# Patient Record
Sex: FEMALE | Race: WHITE | Employment: UNEMPLOYED | URBAN - METROPOLITAN AREA
[De-identification: names, ages, dates, MRNs, and addresses within clinical notes are randomized per-mention and may not be internally consistent; named-entity substitution may affect disease eponyms.]

---

## 2018-03-20 ENCOUNTER — HOSPITAL ENCOUNTER (OUTPATIENT)
Dept: WOUND CARE | Age: 53
Discharge: HOME | End: 2018-03-20
Payer: COMMERCIAL

## 2018-03-20 VITALS
SYSTOLIC BLOOD PRESSURE: 108 MMHG | HEIGHT: 63 IN | DIASTOLIC BLOOD PRESSURE: 70 MMHG | BODY MASS INDEX: 35.44 KG/M2 | RESPIRATION RATE: 18 BRPM | HEART RATE: 100 BPM | WEIGHT: 200 LBS | TEMPERATURE: 97.9 F

## 2018-03-20 PROCEDURE — 99205 OFFICE O/P NEW HI 60 MIN: CPT

## 2018-03-20 PROCEDURE — 11042 DBRDMT SUBQ TIS 1ST 20SQCM/<: CPT

## 2018-03-20 PROCEDURE — 11045 DBRDMT SUBQ TISS EACH ADDL: CPT

## 2018-03-20 RX ORDER — FONDAPARINUX SODIUM 10 MG/.8ML
10 INJECTION SUBCUTANEOUS DAILY
COMMUNITY
End: 2018-04-06 | Stop reason: ALTCHOICE

## 2018-03-20 RX ORDER — OXYCODONE HYDROCHLORIDE 5 MG/1
5 TABLET ORAL EVERY 6 HOURS PRN
COMMUNITY

## 2018-03-20 NOTE — PROGRESS NOTES
needed, Disp: , Rfl:     oxyCODONE (ROXICODONE) 5 MG immediate release tablet, Take 5 mg by mouth every 6 hours as needed for Pain., Disp: , Rfl:     HYDROXYZINE HCL PO, Take  by mouth., Disp: , Rfl:     escitalopram (LEXAPRO) 20 MG tablet, Take 20 mg by mouth daily. , Disp: , Rfl:     ondansetron (ZOFRAN ODT) 4 MG disintegrating tablet, Take 1 tablet by mouth every 8 hours as needed for Nausea or Vomiting for 10 doses. , Disp: 10 tablet, Rfl: 0    Current Facility-Administered Medications:     lidocaine (XYLOCAINE) 2 % jelly, , Topical, Once, Valente Perez MD  Allergies:  Patient has no known allergies. Social History     Social History    Marital status:      Spouse name: N/A    Number of children: N/A    Years of education: N/A     Occupational History    Not on file. Social History Main Topics    Smoking status: Former Smoker     Quit date: 2012    Smokeless tobacco: Never Used    Alcohol use Yes      Comment: occ    Drug use: No    Sexual activity: Not on file     Other Topics Concern    Not on file     Social History Narrative    No narrative on file     Family History   Problem Relation Age of Onset    Cancer Mother     Diabetes Mother     Cancer Father        Objective:    /70   Pulse 100   Temp 97.9 °F (36.6 °C) (Oral)   Resp 18   Ht 5' 3\" (1.6 m)   Wt 200 lb (90.7 kg)   BMI 35.43 kg/m²   Wound Evaluation  - Undermining is  present  - Fibrinous exudate - Minimal amt  - Hyperkeratotic tissue - Minimal amt  - Granulation tissue - Large amt  - Errythema - Minimal amt    Wound 03/20/18 Surgical dehiscence Other (Comment) #1 aquired [id number] suprapubic-Wound Length (cm): 17 cm  Wound 03/20/18 Surgical dehiscence Other (Comment) #1 aquired [id number] suprapubic-Wound Width (cm): 19.3 cm  Wound 03/20/18 Surgical dehiscence Other (Comment) #1 aquired [id number] suprapubic-Wound Depth (cm) : 4.4 Measurements shown are from today's visit.     /70   Pulse 100   Temp 97.9

## 2018-03-27 ENCOUNTER — HOSPITAL ENCOUNTER (OUTPATIENT)
Dept: WOUND CARE | Age: 53
Discharge: HOME | End: 2018-03-27
Payer: COMMERCIAL

## 2018-03-27 VITALS
BODY MASS INDEX: 35.44 KG/M2 | TEMPERATURE: 98.1 F | RESPIRATION RATE: 18 BRPM | SYSTOLIC BLOOD PRESSURE: 138 MMHG | HEIGHT: 63 IN | WEIGHT: 200 LBS | DIASTOLIC BLOOD PRESSURE: 70 MMHG | HEART RATE: 92 BPM

## 2018-03-27 PROCEDURE — 11045 DBRDMT SUBQ TISS EACH ADDL: CPT

## 2018-03-27 PROCEDURE — 11042 DBRDMT SUBQ TIS 1ST 20SQCM/<: CPT

## 2018-03-27 NOTE — PROGRESS NOTES
Wound Care Center Follow-Up Progress Note    Nidia Romero  AGE: 48 y.o. GENDER: female  : 1965    TODAY'S DATE:  3/27/2018    Subjective:    Nidia Romero is a 48 y.o. female who presents today for follow-up examination and treatment of a delayed-healing wound(s). The patient denies any problems since last visit. Objective:    /70   Pulse 92   Temp 98.1 °F (36.7 °C) (Oral)   Resp 18   Ht 5' 3\" (1.6 m)   Wt 200 lb (90.7 kg)   BMI 35.43 kg/m²     (Wound Reference Date is when first assessed. Measurements shown are from today's visit.)    Wound 18 Surgical dehiscence Other (Comment) #1 aquired [id number] suprapubic (Active)   Wound Image   3/20/2018  2:20 PM   Wound Type Wound 3/20/2018  2:20 PM   Wound Other 3/20/2018  2:20 PM   Dressing Status Clean;Dry; Intact 3/20/2018  3:02 PM   Dressing Changed Changed/New 3/20/2018  3:02 PM   Wound Cleansed Rinsed/Irrigated with saline 3/20/2018  3:02 PM   Wound Length (cm) 17 cm 3/27/2018  3:31 PM   Wound Width (cm) 19.2 cm 3/27/2018  3:31 PM   Wound Depth (cm)  2.5 3/27/2018  3:31 PM   Calculated Wound Size (cm^2) (l*w) 326.4 cm^2 3/27/2018  3:31 PM   Change in Wound Size % (l*w) 0.52 3/27/2018  3:31 PM   Wound Assessment Red;Yellow;Pink 3/27/2018  2:55 PM   Drainage Amount Copious 3/27/2018  2:55 PM   Drainage Description Serosanguinous 3/27/2018  2:55 PM   Odor None 3/27/2018  2:55 PM   Charlotte-wound Assessment Intact 3/27/2018  2:55 PM   Time out Yes 3/20/2018  2:56 PM   Procedural Pain 3 3/20/2018  2:56 PM   Post procedural Pain 0 3/20/2018  2:56 PM   Number of days: 7        Other physical exam findings:        Assessment:     There is no problem list on file for this patient. Overall Wound Assessment  Wound has significantly improved. Size has decreased. Appearance has significantly improved.     (Please refer to nursing measurements and assessment regarding wound measurements.) Wound check - Care provided includes removal of

## 2018-04-06 ENCOUNTER — HOSPITAL ENCOUNTER (OUTPATIENT)
Dept: WOUND CARE | Age: 53
Discharge: HOME | End: 2018-04-06
Payer: COMMERCIAL

## 2018-04-06 VITALS
WEIGHT: 200 LBS | HEART RATE: 72 BPM | BODY MASS INDEX: 35.44 KG/M2 | DIASTOLIC BLOOD PRESSURE: 68 MMHG | RESPIRATION RATE: 16 BRPM | SYSTOLIC BLOOD PRESSURE: 120 MMHG | HEIGHT: 63 IN | TEMPERATURE: 97.8 F

## 2018-04-06 PROCEDURE — 97607 NEG PRS WND THR NDME<=50SQCM: CPT

## 2018-04-06 PROCEDURE — 11042 DBRDMT SUBQ TIS 1ST 20SQCM/<: CPT | Performed by: FAMILY MEDICINE

## 2018-04-06 PROCEDURE — 11042 DBRDMT SUBQ TIS 1ST 20SQCM/<: CPT

## 2018-04-06 PROCEDURE — 99213 OFFICE O/P EST LOW 20 MIN: CPT

## 2018-04-13 ENCOUNTER — HOSPITAL ENCOUNTER (OUTPATIENT)
Dept: WOUND CARE | Age: 53
Discharge: HOME | End: 2018-04-13
Payer: COMMERCIAL

## 2018-04-13 VITALS
SYSTOLIC BLOOD PRESSURE: 110 MMHG | TEMPERATURE: 98.3 F | RESPIRATION RATE: 14 BRPM | DIASTOLIC BLOOD PRESSURE: 60 MMHG | HEART RATE: 76 BPM

## 2018-04-13 PROCEDURE — 11042 DBRDMT SUBQ TIS 1ST 20SQCM/<: CPT

## 2018-04-13 PROCEDURE — 11045 DBRDMT SUBQ TISS EACH ADDL: CPT

## 2018-04-20 ENCOUNTER — HOSPITAL ENCOUNTER (OUTPATIENT)
Dept: WOUND CARE | Age: 53
Discharge: HOME | End: 2018-04-20
Payer: COMMERCIAL

## 2018-04-20 VITALS
HEART RATE: 76 BPM | HEIGHT: 63 IN | SYSTOLIC BLOOD PRESSURE: 108 MMHG | DIASTOLIC BLOOD PRESSURE: 54 MMHG | BODY MASS INDEX: 35.44 KG/M2 | RESPIRATION RATE: 18 BRPM | TEMPERATURE: 98 F | WEIGHT: 200 LBS

## 2018-04-20 PROCEDURE — 11042 DBRDMT SUBQ TIS 1ST 20SQCM/<: CPT

## 2018-04-20 PROCEDURE — 97598 DBRDMT OPN WND ADDL 20CM/<: CPT | Performed by: FAMILY MEDICINE

## 2018-04-20 PROCEDURE — 97597 DBRDMT OPN WND 1ST 20 CM/<: CPT | Performed by: FAMILY MEDICINE

## 2018-05-04 ENCOUNTER — HOSPITAL ENCOUNTER (OUTPATIENT)
Dept: WOUND CARE | Age: 53
Discharge: HOME | End: 2018-05-04

## 2018-05-18 ENCOUNTER — HOSPITAL ENCOUNTER (OUTPATIENT)
Dept: WOUND CARE | Age: 53
Discharge: HOME | End: 2018-05-18
Payer: MEDICAID

## 2018-05-18 VITALS
DIASTOLIC BLOOD PRESSURE: 72 MMHG | HEART RATE: 80 BPM | TEMPERATURE: 97.9 F | RESPIRATION RATE: 18 BRPM | SYSTOLIC BLOOD PRESSURE: 110 MMHG | HEIGHT: 63 IN | BODY MASS INDEX: 35.44 KG/M2 | WEIGHT: 200 LBS

## 2018-05-18 DIAGNOSIS — B36.9 FUNGAL DERMATITIS: Primary | ICD-10-CM

## 2018-05-18 PROCEDURE — 11042 DBRDMT SUBQ TIS 1ST 20SQCM/<: CPT

## 2018-05-18 PROCEDURE — 11045 DBRDMT SUBQ TISS EACH ADDL: CPT | Performed by: FAMILY MEDICINE

## 2018-05-18 PROCEDURE — 11042 DBRDMT SUBQ TIS 1ST 20SQCM/<: CPT | Performed by: FAMILY MEDICINE

## 2018-05-18 PROCEDURE — 11045 DBRDMT SUBQ TISS EACH ADDL: CPT

## 2018-05-18 RX ORDER — KETOCONAZOLE 20 MG/G
CREAM TOPICAL
Qty: 60 G | Refills: 1 | Status: SHIPPED | OUTPATIENT
Start: 2018-05-18

## 2018-05-25 ENCOUNTER — HOSPITAL ENCOUNTER (OUTPATIENT)
Dept: WOUND CARE | Age: 53
Discharge: HOME | End: 2018-05-25
Payer: MEDICAID

## 2018-05-25 VITALS
DIASTOLIC BLOOD PRESSURE: 56 MMHG | BODY MASS INDEX: 35.44 KG/M2 | TEMPERATURE: 97.6 F | HEART RATE: 76 BPM | RESPIRATION RATE: 20 BRPM | SYSTOLIC BLOOD PRESSURE: 100 MMHG | WEIGHT: 200 LBS | HEIGHT: 63 IN

## 2018-05-25 PROCEDURE — 11042 DBRDMT SUBQ TIS 1ST 20SQCM/<: CPT | Performed by: FAMILY MEDICINE

## 2018-05-25 PROCEDURE — 11042 DBRDMT SUBQ TIS 1ST 20SQCM/<: CPT

## 2018-05-25 PROCEDURE — 11045 DBRDMT SUBQ TISS EACH ADDL: CPT

## 2018-05-25 PROCEDURE — 11045 DBRDMT SUBQ TISS EACH ADDL: CPT | Performed by: FAMILY MEDICINE

## 2018-06-01 ENCOUNTER — HOSPITAL ENCOUNTER (OUTPATIENT)
Dept: WOUND CARE | Age: 53
Discharge: HOME | End: 2018-06-01
Payer: COMMERCIAL

## 2018-06-01 VITALS
HEIGHT: 63 IN | SYSTOLIC BLOOD PRESSURE: 108 MMHG | DIASTOLIC BLOOD PRESSURE: 60 MMHG | WEIGHT: 200 LBS | RESPIRATION RATE: 20 BRPM | TEMPERATURE: 97.7 F | BODY MASS INDEX: 35.44 KG/M2 | HEART RATE: 82 BPM

## 2018-06-01 PROCEDURE — 11045 DBRDMT SUBQ TISS EACH ADDL: CPT

## 2018-06-01 PROCEDURE — 11045 DBRDMT SUBQ TISS EACH ADDL: CPT | Performed by: FAMILY MEDICINE

## 2018-06-01 PROCEDURE — 11042 DBRDMT SUBQ TIS 1ST 20SQCM/<: CPT | Performed by: FAMILY MEDICINE

## 2018-06-01 PROCEDURE — 11042 DBRDMT SUBQ TIS 1ST 20SQCM/<: CPT

## 2018-06-08 ENCOUNTER — HOSPITAL ENCOUNTER (OUTPATIENT)
Dept: WOUND CARE | Age: 53
Discharge: HOME | End: 2018-06-08
Payer: MEDICAID

## 2018-06-08 VITALS
HEART RATE: 64 BPM | TEMPERATURE: 98 F | DIASTOLIC BLOOD PRESSURE: 62 MMHG | SYSTOLIC BLOOD PRESSURE: 100 MMHG | RESPIRATION RATE: 18 BRPM

## 2018-06-08 PROCEDURE — 97597 DBRDMT OPN WND 1ST 20 CM/<: CPT | Performed by: FAMILY MEDICINE

## 2018-06-08 PROCEDURE — 97597 DBRDMT OPN WND 1ST 20 CM/<: CPT

## 2018-06-15 ENCOUNTER — HOSPITAL ENCOUNTER (OUTPATIENT)
Dept: WOUND CARE | Age: 53
Discharge: HOME | End: 2018-06-15
Payer: MEDICAID

## 2018-06-15 VITALS
HEART RATE: 88 BPM | HEIGHT: 63 IN | DIASTOLIC BLOOD PRESSURE: 64 MMHG | TEMPERATURE: 98 F | BODY MASS INDEX: 35.44 KG/M2 | SYSTOLIC BLOOD PRESSURE: 112 MMHG | RESPIRATION RATE: 18 BRPM | WEIGHT: 200 LBS

## 2018-06-15 PROCEDURE — 11042 DBRDMT SUBQ TIS 1ST 20SQCM/<: CPT | Performed by: FAMILY MEDICINE

## 2018-06-15 PROCEDURE — 11042 DBRDMT SUBQ TIS 1ST 20SQCM/<: CPT

## 2018-06-22 ENCOUNTER — HOSPITAL ENCOUNTER (OUTPATIENT)
Dept: WOUND CARE | Age: 53
Discharge: HOME | End: 2018-06-22
Payer: MEDICAID

## 2018-06-22 VITALS
RESPIRATION RATE: 18 BRPM | SYSTOLIC BLOOD PRESSURE: 110 MMHG | DIASTOLIC BLOOD PRESSURE: 60 MMHG | TEMPERATURE: 98 F | HEART RATE: 76 BPM

## 2018-06-22 PROCEDURE — 99212 OFFICE O/P EST SF 10 MIN: CPT

## 2018-06-22 PROCEDURE — 99213 OFFICE O/P EST LOW 20 MIN: CPT | Performed by: FAMILY MEDICINE

## 2018-07-06 ENCOUNTER — HOSPITAL ENCOUNTER (OUTPATIENT)
Dept: WOUND CARE | Age: 53
Discharge: HOME | End: 2018-07-06
Payer: MEDICAID

## 2018-07-06 VITALS
SYSTOLIC BLOOD PRESSURE: 110 MMHG | DIASTOLIC BLOOD PRESSURE: 62 MMHG | RESPIRATION RATE: 16 BRPM | HEART RATE: 72 BPM | TEMPERATURE: 98 F

## 2018-07-06 PROCEDURE — 99212 OFFICE O/P EST SF 10 MIN: CPT | Performed by: FAMILY MEDICINE

## 2018-07-06 PROCEDURE — 99212 OFFICE O/P EST SF 10 MIN: CPT

## 2018-07-06 NOTE — PROGRESS NOTES
Follow-Up Wound Progress Note  Alana Armendariz  AGE: 48 y.o. GENDER: female  DOD: 1965  TODAY'S DATE:  7/6/18  Subjective:    Lianna Schmitt is a 48 y.o. female who presents today for wound check. Wound Etiology :  dehisced surgical wound  Wound Location :   suprapubic and right groin now is no open wound there is only some excoriation Pain : {1/10     Abx : Absent       Overall Wound Assessment  Wound is healed. Size has decreased    Objective:    /62   Pulse 72   Temp 98 °F (36.7 °C) (Oral)   Resp 16   Wound 03/20/18 Surgical dehiscence Other (Comment) #1 At [facility] [id number] suprapubic (Active)   Wound Image   3/20/2018  2:20 PM   Wound Type Wound 3/20/2018  2:20 PM   Wound Other 3/20/2018  2:20 PM   Dressing Status Clean;Dry; Intact 7/6/2018 11:56 AM   Dressing Changed Changed/New 7/6/2018 11:56 AM   Dressing/Treatment Dry dressing 7/6/2018 11:56 AM   Wound Cleansed Rinsed/Irrigated with saline 7/6/2018 11:56 AM   Wound Length (cm) 0 cm 6/22/2018  9:52 AM   Wound Width (cm) 0 cm 6/22/2018  9:52 AM   Wound Depth (cm)  0 6/22/2018  9:52 AM   Calculated Wound Size (cm^2) (l*w) 0 cm^2 6/22/2018  9:52 AM   Change in Wound Size % (l*w) 100 6/22/2018  9:52 AM   Wound Assessment Pink;Red 6/15/2018  9:34 AM   Drainage Amount Moderate 6/15/2018  9:34 AM   Drainage Description Goff;Brown 6/15/2018  9:34 AM   Odor None 6/15/2018  9:34 AM   Charlotte-wound Assessment Pink; Maceration 6/15/2018  9:34 AM   Time out Yes 6/15/2018 10:06 AM   Procedural Pain 0 6/15/2018 10:06 AM   Post procedural Pain 0 6/15/2018 10:06 AM   Number of days: 108     Wound   Dry with some excoriation  Errythema - Present    (this wound was originally measured on:)            Measurements shown are from today's visit. Assessment:     Please refer to nursing measurements and assessment regarding wound size pre and post debridement.   Wound check - Care provided includes removal of existing dressing and visual

## 2023-08-08 LAB
ANION GAP IN SER/PLAS: 14 MMOL/L (ref 10–20)
CALCIUM (MG/DL) IN SER/PLAS: 9.9 MG/DL (ref 8.6–10.3)
CARBON DIOXIDE, TOTAL (MMOL/L) IN SER/PLAS: 24 MMOL/L (ref 21–32)
CHLORIDE (MMOL/L) IN SER/PLAS: 105 MMOL/L (ref 98–107)
CREATININE (MG/DL) IN SER/PLAS: 1 MG/DL (ref 0.5–1.05)
GFR FEMALE: 66 ML/MIN/1.73M2
GLUCOSE (MG/DL) IN SER/PLAS: 140 MG/DL (ref 74–99)
POTASSIUM (MMOL/L) IN SER/PLAS: 4.4 MMOL/L (ref 3.5–5.3)
SODIUM (MMOL/L) IN SER/PLAS: 139 MMOL/L (ref 136–145)
UREA NITROGEN (MG/DL) IN SER/PLAS: 15 MG/DL (ref 6–23)

## 2023-08-09 LAB — CANCER AG 125 (U/ML) IN SERUM: 12.8 U/ML (ref 0–30.2)

## 2023-08-30 PROBLEM — F43.10 PTSD (POST-TRAUMATIC STRESS DISORDER): Status: ACTIVE | Noted: 2023-08-30

## 2023-08-30 PROBLEM — K43.0: Status: ACTIVE | Noted: 2023-08-30

## 2023-08-30 PROBLEM — I82.409 DEEP VENOUS THROMBOSIS (MULTI): Status: ACTIVE | Noted: 2023-08-30

## 2023-08-30 PROBLEM — E03.9 HYPOTHYROIDISM: Status: ACTIVE | Noted: 2023-08-30

## 2023-08-30 PROBLEM — S33.5XXA LUMBAR SPRAIN: Status: ACTIVE | Noted: 2023-08-30

## 2023-08-30 PROBLEM — E11.9 TYPE 2 DIABETES MELLITUS (MULTI): Status: ACTIVE | Noted: 2023-08-30

## 2023-08-30 PROBLEM — N20.0 KIDNEY STONES: Status: ACTIVE | Noted: 2023-08-30

## 2023-08-30 PROBLEM — C56.9 MALIGNANT NEOPLASM OF OVARY (MULTI): Status: ACTIVE | Noted: 2023-08-30

## 2023-08-30 PROBLEM — E78.2 MIXED HYPERLIPIDEMIA: Status: ACTIVE | Noted: 2023-08-30

## 2023-08-30 PROBLEM — H35.30 MACULAR DEGENERATION: Status: ACTIVE | Noted: 2023-08-30

## 2023-08-30 PROBLEM — Z96.1 BILATERAL PSEUDOPHAKIA: Status: ACTIVE | Noted: 2023-08-30

## 2023-08-30 PROBLEM — R53.81 MALAISE: Status: ACTIVE | Noted: 2023-08-30

## 2023-08-30 PROBLEM — Z86.718 HISTORY OF DEEP VEIN THROMBOSIS: Status: ACTIVE | Noted: 2023-08-30

## 2023-08-30 PROBLEM — G62.9 NEUROPATHY: Status: ACTIVE | Noted: 2023-08-30

## 2023-08-30 PROBLEM — N23 RENAL COLIC: Status: ACTIVE | Noted: 2023-08-30

## 2023-08-30 PROBLEM — R30.0 DYSURIA: Status: ACTIVE | Noted: 2023-08-30

## 2023-08-30 PROBLEM — B02.23 POST-HERPETIC POLYNEUROPATHY: Status: ACTIVE | Noted: 2023-08-30

## 2023-08-30 RX ORDER — BUPROPION HYDROCHLORIDE 150 MG/1
1 TABLET ORAL DAILY
COMMUNITY
Start: 2021-10-22 | End: 2023-10-09 | Stop reason: ALTCHOICE

## 2023-08-30 RX ORDER — LEVOTHYROXINE SODIUM 100 UG/1
1 TABLET ORAL
COMMUNITY
Start: 2019-12-18 | End: 2024-03-04

## 2023-08-30 RX ORDER — ALBUTEROL SULFATE 90 UG/1
2 AEROSOL, METERED RESPIRATORY (INHALATION) EVERY 6 HOURS PRN
COMMUNITY
Start: 2018-02-23 | End: 2023-10-17 | Stop reason: ALTCHOICE

## 2023-08-30 RX ORDER — LISINOPRIL 2.5 MG/1
1 TABLET ORAL DAILY
COMMUNITY
Start: 2018-10-04 | End: 2024-03-26

## 2023-08-30 RX ORDER — LAMOTRIGINE 25 MG/1
1 TABLET ORAL DAILY
COMMUNITY
End: 2023-10-09 | Stop reason: ALTCHOICE

## 2023-08-30 RX ORDER — LEVOTHYROXINE SODIUM 75 UG/1
1 TABLET ORAL DAILY
COMMUNITY
End: 2023-10-09 | Stop reason: ALTCHOICE

## 2023-08-30 RX ORDER — SEMAGLUTIDE 0.68 MG/ML
INJECTION, SOLUTION SUBCUTANEOUS
COMMUNITY
Start: 2023-04-07 | End: 2023-10-09 | Stop reason: ALTCHOICE

## 2023-08-30 RX ORDER — SITAGLIPTIN AND METFORMIN HYDROCHLORIDE 1000; 50 MG/1; MG/1
2 TABLET, FILM COATED, EXTENDED RELEASE ORAL DAILY
COMMUNITY
End: 2023-10-09 | Stop reason: ALTCHOICE

## 2023-08-30 RX ORDER — KETOCONAZOLE 20 MG/G
1 CREAM TOPICAL 2 TIMES DAILY
COMMUNITY
Start: 2022-06-08 | End: 2023-10-17 | Stop reason: ALTCHOICE

## 2023-08-30 RX ORDER — PAROXETINE 30 MG/1
2 TABLET, FILM COATED ORAL EVERY MORNING
COMMUNITY
Start: 2020-03-09 | End: 2023-11-16 | Stop reason: ALTCHOICE

## 2023-08-30 RX ORDER — TRAZODONE HYDROCHLORIDE 50 MG/1
1 TABLET ORAL NIGHTLY PRN
COMMUNITY
Start: 2022-09-21 | End: 2023-10-09 | Stop reason: ALTCHOICE

## 2023-08-30 RX ORDER — CETIRIZINE HYDROCHLORIDE 10 MG/1
1 TABLET ORAL DAILY PRN
COMMUNITY
Start: 2018-08-27

## 2023-08-30 RX ORDER — BUPROPION HYDROCHLORIDE 300 MG/1
1 TABLET ORAL EVERY MORNING
COMMUNITY
End: 2023-10-17 | Stop reason: ALTCHOICE

## 2023-08-30 RX ORDER — IBUPROFEN 200 MG
2 TABLET ORAL DAILY PRN
COMMUNITY
End: 2023-10-09 | Stop reason: ALTCHOICE

## 2023-08-30 RX ORDER — LORAZEPAM 0.5 MG/1
1 TABLET ORAL DAILY PRN
COMMUNITY
Start: 2018-04-02 | End: 2024-05-16 | Stop reason: ALTCHOICE

## 2023-08-30 RX ORDER — TRAMADOL HYDROCHLORIDE 50 MG/1
1 TABLET ORAL EVERY 6 HOURS PRN
COMMUNITY
Start: 2023-02-12 | End: 2023-10-09 | Stop reason: ALTCHOICE

## 2023-10-09 ENCOUNTER — ANESTHESIA EVENT (OUTPATIENT)
Dept: OPERATING ROOM | Facility: HOSPITAL | Age: 58
End: 2023-10-09
Payer: COMMERCIAL

## 2023-10-09 ENCOUNTER — ANESTHESIA (OUTPATIENT)
Dept: OPERATING ROOM | Facility: HOSPITAL | Age: 58
End: 2023-10-09
Payer: COMMERCIAL

## 2023-10-09 ENCOUNTER — HOSPITAL ENCOUNTER (OUTPATIENT)
Dept: OPERATING ROOM | Facility: HOSPITAL | Age: 58
Setting detail: OUTPATIENT SURGERY
Discharge: HOME | End: 2023-10-09
Payer: COMMERCIAL

## 2023-10-09 VITALS
HEART RATE: 65 BPM | WEIGHT: 189.26 LBS | RESPIRATION RATE: 16 BRPM | HEIGHT: 63 IN | TEMPERATURE: 97.5 F | OXYGEN SATURATION: 99 % | BODY MASS INDEX: 33.54 KG/M2 | DIASTOLIC BLOOD PRESSURE: 72 MMHG | SYSTOLIC BLOOD PRESSURE: 93 MMHG

## 2023-10-09 DIAGNOSIS — Z12.11 ENCOUNTER FOR SCREENING FOR MALIGNANT NEOPLASM OF COLON: ICD-10-CM

## 2023-10-09 LAB — GLUCOSE BLD MANUAL STRIP-MCNC: 151 MG/DL (ref 74–99)

## 2023-10-09 PROCEDURE — 3600000002 HC OR TIME - INITIAL BASE CHARGE - PROCEDURE LEVEL TWO: Performed by: STUDENT IN AN ORGANIZED HEALTH CARE EDUCATION/TRAINING PROGRAM

## 2023-10-09 PROCEDURE — 82947 ASSAY GLUCOSE BLOOD QUANT: CPT

## 2023-10-09 PROCEDURE — A45378 PR COLONOSCOPY,DIAGNOSTIC: Performed by: NURSE ANESTHETIST, CERTIFIED REGISTERED

## 2023-10-09 PROCEDURE — 2580000001 HC RX 258 IV SOLUTIONS: Performed by: ANESTHESIOLOGY

## 2023-10-09 PROCEDURE — 7100000009 HC PHASE TWO TIME - INITIAL BASE CHARGE: Performed by: STUDENT IN AN ORGANIZED HEALTH CARE EDUCATION/TRAINING PROGRAM

## 2023-10-09 PROCEDURE — 3700000002 HC GENERAL ANESTHESIA TIME - EACH INCREMENTAL 1 MINUTE: Performed by: STUDENT IN AN ORGANIZED HEALTH CARE EDUCATION/TRAINING PROGRAM

## 2023-10-09 PROCEDURE — 3700000001 HC GENERAL ANESTHESIA TIME - INITIAL BASE CHARGE: Performed by: STUDENT IN AN ORGANIZED HEALTH CARE EDUCATION/TRAINING PROGRAM

## 2023-10-09 PROCEDURE — 2500000004 HC RX 250 GENERAL PHARMACY W/ HCPCS (ALT 636 FOR OP/ED): Performed by: NURSE ANESTHETIST, CERTIFIED REGISTERED

## 2023-10-09 PROCEDURE — 45378 DIAGNOSTIC COLONOSCOPY: CPT | Performed by: INTERNAL MEDICINE

## 2023-10-09 PROCEDURE — 7100000010 HC PHASE TWO TIME - EACH INCREMENTAL 1 MINUTE: Performed by: STUDENT IN AN ORGANIZED HEALTH CARE EDUCATION/TRAINING PROGRAM

## 2023-10-09 PROCEDURE — A45378 PR COLONOSCOPY,DIAGNOSTIC: Performed by: STUDENT IN AN ORGANIZED HEALTH CARE EDUCATION/TRAINING PROGRAM

## 2023-10-09 PROCEDURE — 3600000007 HC OR TIME - EACH INCREMENTAL 1 MINUTE - PROCEDURE LEVEL TWO: Performed by: STUDENT IN AN ORGANIZED HEALTH CARE EDUCATION/TRAINING PROGRAM

## 2023-10-09 RX ORDER — PROPOFOL 10 MG/ML
INJECTION, EMULSION INTRAVENOUS CONTINUOUS PRN
Status: DISCONTINUED | OUTPATIENT
Start: 2023-10-09 | End: 2023-10-09

## 2023-10-09 RX ORDER — MIDAZOLAM HYDROCHLORIDE 1 MG/ML
INJECTION, SOLUTION INTRAMUSCULAR; INTRAVENOUS AS NEEDED
Status: DISCONTINUED | OUTPATIENT
Start: 2023-10-09 | End: 2023-10-09

## 2023-10-09 RX ORDER — TRAZODONE HYDROCHLORIDE 100 MG/1
100 TABLET ORAL NIGHTLY
COMMUNITY
End: 2023-11-16 | Stop reason: SDUPTHER

## 2023-10-09 RX ORDER — DULAGLUTIDE 1.5 MG/.5ML
1.5 INJECTION, SOLUTION SUBCUTANEOUS
COMMUNITY
Start: 2023-09-18 | End: 2024-02-23 | Stop reason: DRUGHIGH

## 2023-10-09 RX ORDER — SODIUM CHLORIDE, SODIUM LACTATE, POTASSIUM CHLORIDE, CALCIUM CHLORIDE 600; 310; 30; 20 MG/100ML; MG/100ML; MG/100ML; MG/100ML
100 INJECTION, SOLUTION INTRAVENOUS CONTINUOUS
Status: DISCONTINUED | OUTPATIENT
Start: 2023-10-09 | End: 2023-10-20 | Stop reason: HOSPADM

## 2023-10-09 RX ORDER — PROPOFOL 10 MG/ML
INJECTION, EMULSION INTRAVENOUS AS NEEDED
Status: DISCONTINUED | OUTPATIENT
Start: 2023-10-09 | End: 2023-10-09

## 2023-10-09 RX ADMIN — PROPOFOL 40 MG: 10 INJECTION, EMULSION INTRAVENOUS at 10:37

## 2023-10-09 RX ADMIN — PROPOFOL 100 MCG/KG/MIN: 10 INJECTION, EMULSION INTRAVENOUS at 10:37

## 2023-10-09 RX ADMIN — SODIUM CHLORIDE, POTASSIUM CHLORIDE, SODIUM LACTATE AND CALCIUM CHLORIDE 100 ML/HR: 600; 310; 30; 20 INJECTION, SOLUTION INTRAVENOUS at 09:54

## 2023-10-09 RX ADMIN — MIDAZOLAM 2 MG: 1 INJECTION INTRAMUSCULAR; INTRAVENOUS at 10:35

## 2023-10-09 SDOH — HEALTH STABILITY: MENTAL HEALTH: CURRENT SMOKER: 0

## 2023-10-09 ASSESSMENT — PAIN SCALES - GENERAL
PAINLEVEL_OUTOF10: 0 - NO PAIN
PAIN_LEVEL: 0
PAINLEVEL_OUTOF10: 0 - NO PAIN

## 2023-10-09 ASSESSMENT — ENCOUNTER SYMPTOMS: CONSTIPATION: 1

## 2023-10-09 ASSESSMENT — PAIN - FUNCTIONAL ASSESSMENT: PAIN_FUNCTIONAL_ASSESSMENT: 0-10

## 2023-10-09 NOTE — ANESTHESIA POSTPROCEDURE EVALUATION
Patient: Serenity Russell    Procedure Summary       Date: 10/09/23 Room / Location: [facility] OR    Anesthesia Start: 1033 Anesthesia Stop: 1102    Procedure: COLONOSCOPY Diagnosis: Encounter for screening for malignant neoplasm of colon    Scheduled Providers: Bony Turner MD; LYNDSEY Hess-CHARLEE; Bryan Donnelly DO Responsible Provider: Bryan Donnelly DO    Anesthesia Type: MAC ASA Status: 3            Anesthesia Type: MAC    Vitals Value Taken Time   /65 10/09/23 1102   Temp 36.4 10/09/23 1102   Pulse 100 10/09/23 1102   Resp 15 10/09/23 1102   SpO2 93 10/09/23 1102       Anesthesia Post Evaluation    Patient location during evaluation: PACU  Patient participation: complete - patient participated  Level of consciousness: awake  Pain score: 0  Pain management: adequate  Airway patency: patent  Cardiovascular status: acceptable  Respiratory status: acceptable        There were no known notable events for this encounter.

## 2023-10-09 NOTE — DISCHARGE INSTRUCTIONS
Patient Instructions after a Colonoscopy      The anesthetics, sedatives or narcotics which were given to you today will be acting in your body for the next 24 hours, so you might feel a little sleepy or groggy.  This feeling should slowly wear off. Carefully read and follow the instructions.     You received sedation today:  - Do not drive or operate any machinery or power tools of any kind.   - No alcoholic beverages today, not even beer or wine.  - Do not make any important decisions or sign any legal documents.  - No over the counter medications that contain alcohol or that may cause drowsiness.  - Do not make any important decisions or sign any legal documents.    While it is common to experience mild to moderate abdominal distention, gas, or belching after your procedure, if any of these symptoms occur following discharge from the GI Lab or within one week of having your procedure, call the [facility] to be advised whether a visit to your nearest Urgent Care or Emergency Department is indicated.  Take this paper with you if you go.     - If you develop an allergic reaction to the medications that were given during your procedure such as difficulty breathing, rash, hives, severe nausea, vomiting or lightheadedness.  - If you experience chest pain, shortness of breath, severe abdominal pain, fevers and chills.  -If you develop signs and symptoms of bleeding such as blood in your spit, if your stools turn black, tarry, or bloody  - If you have not urinated within 8 hours following your procedure.  - If your IV site becomes painful, red, inflamed, or looks infected.    If you received a biopsy/polypectomy/sphincterotomy the following instructions apply below:    __ Do not use Aspirin containing products, non-steroidal medications or anti-coagulants for one week following your procedure. (Examples of these types of medications are: Advil, Arthrotec, Aleve, Coumadin, Ecotrin, Heparin, Ibuprofen,  Indocin, Motrin, Naprosyn, Nuprin, Plavix, Vioxx, and Voltarin, or their generic forms.  This list is not all-inclusive.  Check with your physician or pharmacist before resuming medications.)   __ Eat a soft diet today.  Avoid foods that are poorly digested for the next 24 hours.  These foods would include: nuts, beans, lettuce, red meats, and fried foods. Start with liquids and advance your diet as tolerated, gradually work up to eating solids.   __ Do not have a Barium Study or Enema for one week.    Your physician recommends the additional following instructions:    -You have a contact number available for emergencies. The signs and symptoms of potential delayed complications were discussed with you. You may return to normal activities tomorrow.  -Resume your previous diet.  -Continue your present medications.   -We are waiting for your pathology results.  -Your physician has recommended a repeat colonoscopy (date to be determined after pending pathology results are reviewed) for surveillance based on pathology results.  -The findings and recommendations have been discussed with you.  -The findings and recommendations were discussed with your family.  - Please see Medication Reconciliation Form for new medication/medications prescribed.       If you experience any problems or have any questions following discharge from the GI Lab, please call:        Nurse Signature                                                                        Date___________________                                                                            Patient/Responsible Party Signature                                        Date___________________

## 2023-10-09 NOTE — H&P
"History Of Present Illness  Serenity Russell is a 58 y.o. female presenting to GI lab for screening colonoscopy     Past Medical History  Past Medical History:   Diagnosis Date    Cancer (CMS/HCC)     Diabetes mellitus (CMS/HCC)        Surgical History  Past Surgical History:   Procedure Laterality Date    CT GUIDED PERCUTANEOUS BIOPSY MEDIASTINUM  10/3/2017    CT GUIDED PERCUTANEOUS BIOPSY MEDIASTINUM 10/3/2017 [facility] LE[facility]Y    US GUIDED ABSCESS DRAIN  9/21/2017    US GUIDED ABSCESS DRAIN 9/21/2017 [facility] CLINICAL LEGAC[facility]    US GUIDED PERCUTANEOUS PERITONEAL OR RETROPERITONEAL FLUID COLLECTION DRAINAGE  9/21/2017    US GUIDED PERCUTANEOUS PERITONEAL OR RETROPERITONEAL FLUID COLLECTION DRAINAGE 9/21/2017 [facility] CLINICAL LEGACY        Social History  She reports that she has never smoked. She has never used smokeless tobacco. She reports current drug use. Frequency: 7.00 times per week. Drug: Marijuana. She reports that she does not drink alcohol.    Family History  Family History   Problem Relation Name Age of Onset    Diabetes Mother      Cancer Father      No Known Problems Sister      No Known Problems Sister      No Known Problems Brother      No Known Problems Brother          Allergies  Adhesive    Review of Systems   Gastrointestinal:  Positive for constipation.        Physical Exam  Cardiovascular:      Rate and Rhythm: Normal rate and regular rhythm.   Pulmonary:      Effort: Pulmonary effort is normal.      Breath sounds: Normal breath sounds.          Last Recorded Vitals  Blood pressure 119/76, pulse 53, temperature 36 °C (96.8 °F), height 1.6 m (5' 3\"), weight 85.9 kg (189 lb 4.2 oz), SpO2 99 %.    Relevant Results             Assessment/Plan   Active Problems:  There are no active Hospital Problems.    Screening colonoscopy              Bony Turner MD    "

## 2023-10-09 NOTE — ANESTHESIA PREPROCEDURE EVALUATION
Patient: Serenity Russell    Procedure Information       Date/Time: 10/09/23 1000    Scheduled providers: Bony Turner MD; LYNDSEY Hess-CRNA; Bryan Donnelly DO    Procedure: COLONOSCOPY    Location: [facility] OR            Relevant Problems   Cardiovascular   (+) Mixed hyperlipidemia      Endocrine   (+) Hypothyroidism   (+) Type 2 diabetes mellitus (CMS/HCC)      /Renal   (+) Kidney stones      Neuro/Psych   (+) PTSD (post-traumatic stress disorder)   (+) Post-herpetic polyneuropathy       Clinical information reviewed:    Allergies  Meds   Med Hx  Surg Hx   Fam Hx  Soc Hx        NPO Detail:  NPO/Void Status  Date of Last Liquid: 10/09/23  Time of Last Liquid: 0700  Date of Last Solid: 10/07/23  Time of Last Void: 0700         Physical Exam    Airway  Mallampati: II  TM distance: >3 FB  Neck ROM: full     Cardiovascular   Rhythm: regular  Rate: normal     Dental - normal exam     Pulmonary   Breath sounds clear to auscultation     Abdominal            Anesthesia Plan    ASA 3     MAC     The patient is not a current smoker.    intravenous induction   Anesthetic plan and risks discussed with patient and spouse.  Use of blood products discussed with patient who.       Arava Counseling:  Patient counseled regarding adverse effects of Arava including but not limited to nausea, vomiting, abnormalities in liver function tests. Patients may develop mouth sores, rash, diarrhea, and abnormalities in blood counts. The patient understands that monitoring is required including LFTs and blood counts.  There is a rare possibility of scarring of the liver and lung problems that can occur when taking methotrexate. Persistent nausea, loss of appetite, pale stools, dark urine, cough, and shortness of breath should be reported immediately. Patient advised to discontinue Arava treatment and consult with a physician prior to attempting conception. The patient will have to undergo a treatment to eliminate Arava from the body prior to conception.

## 2023-10-17 ENCOUNTER — OFFICE VISIT (OUTPATIENT)
Dept: PRIMARY CARE | Facility: CLINIC | Age: 58
End: 2023-10-17
Payer: COMMERCIAL

## 2023-10-17 ENCOUNTER — DOCUMENTATION (OUTPATIENT)
Dept: GASTROENTEROLOGY | Facility: CLINIC | Age: 58
End: 2023-10-17

## 2023-10-17 VITALS
BODY MASS INDEX: 33.84 KG/M2 | OXYGEN SATURATION: 98 % | DIASTOLIC BLOOD PRESSURE: 70 MMHG | SYSTOLIC BLOOD PRESSURE: 110 MMHG | WEIGHT: 191 LBS | HEIGHT: 63 IN | TEMPERATURE: 98.6 F | HEART RATE: 70 BPM | RESPIRATION RATE: 19 BRPM

## 2023-10-17 DIAGNOSIS — J01.00 ACUTE NON-RECURRENT MAXILLARY SINUSITIS: Primary | ICD-10-CM

## 2023-10-17 PROCEDURE — 99213 OFFICE O/P EST LOW 20 MIN: CPT | Performed by: FAMILY MEDICINE

## 2023-10-17 PROCEDURE — 3008F BODY MASS INDEX DOCD: CPT | Performed by: FAMILY MEDICINE

## 2023-10-17 PROCEDURE — 1036F TOBACCO NON-USER: CPT | Performed by: FAMILY MEDICINE

## 2023-10-17 PROCEDURE — 4010F ACE/ARB THERAPY RXD/TAKEN: CPT | Performed by: FAMILY MEDICINE

## 2023-10-17 PROCEDURE — 3074F SYST BP LT 130 MM HG: CPT | Performed by: FAMILY MEDICINE

## 2023-10-17 PROCEDURE — 3078F DIAST BP <80 MM HG: CPT | Performed by: FAMILY MEDICINE

## 2023-10-17 RX ORDER — AMOXICILLIN AND CLAVULANATE POTASSIUM 875; 125 MG/1; MG/1
875 TABLET, FILM COATED ORAL 2 TIMES DAILY
Qty: 20 TABLET | Refills: 0 | Status: SHIPPED | OUTPATIENT
Start: 2023-10-17 | End: 2023-10-27

## 2023-10-17 ASSESSMENT — ENCOUNTER SYMPTOMS
FATIGUE: 0
RHINORRHEA: 1
COUGH: 1
SINUS PAIN: 1
FEVER: 0
SINUS PRESSURE: 1
CARDIOVASCULAR NEGATIVE: 1
WHEEZING: 1

## 2023-10-17 ASSESSMENT — PATIENT HEALTH QUESTIONNAIRE - PHQ9
1. LITTLE INTEREST OR PLEASURE IN DOING THINGS: NOT AT ALL
2. FEELING DOWN, DEPRESSED OR HOPELESS: NOT AT ALL
SUM OF ALL RESPONSES TO PHQ9 QUESTIONS 1 AND 2: 0

## 2023-10-17 ASSESSMENT — PAIN SCALES - GENERAL: PAINLEVEL: 0-NO PAIN

## 2023-10-17 NOTE — PROGRESS NOTES
Called and LMOM. Colonoscopy was normal. I asked that she please have her gastric emptying study completed and follow up with me in the office for further care of her nausea/vomiting and abdominal pain.

## 2023-11-03 ENCOUNTER — TELEPHONE (OUTPATIENT)
Dept: PRIMARY CARE | Facility: CLINIC | Age: 58
End: 2023-11-03
Payer: COMMERCIAL

## 2023-11-03 DIAGNOSIS — H60.339 ACUTE SWIMMER'S EAR, UNSPECIFIED LATERALITY: Primary | ICD-10-CM

## 2023-11-03 RX ORDER — CIPROFLOXACIN HYDROCHLORIDE AND HYDROCORTISONE 2; 10 MG/ML; MG/ML
3 SUSPENSION AURICULAR (OTIC) 2 TIMES DAILY
Qty: 10 ML | Refills: 0 | Status: SHIPPED | OUTPATIENT
Start: 2023-11-03 | End: 2023-11-10

## 2023-11-10 RX ORDER — NEOMYCIN SULFATE, POLYMYXIN B SULFATE, HYDROCORTISONE 3.5; 10000; 1 MG/ML; [USP'U]/ML; MG/ML
2 SOLUTION/ DROPS AURICULAR (OTIC) 4 TIMES DAILY
Qty: 2.8 ML | Refills: 0 | Status: SHIPPED | OUTPATIENT
Start: 2023-11-10 | End: 2023-11-17

## 2023-11-13 ENCOUNTER — OFFICE VISIT (OUTPATIENT)
Dept: OPHTHALMOLOGY | Facility: CLINIC | Age: 58
End: 2023-11-13
Payer: COMMERCIAL

## 2023-11-13 ENCOUNTER — APPOINTMENT (OUTPATIENT)
Dept: OPHTHALMOLOGY | Facility: CLINIC | Age: 58
End: 2023-11-13
Payer: COMMERCIAL

## 2023-11-13 ENCOUNTER — CLINICAL SUPPORT (OUTPATIENT)
Dept: OPHTHALMOLOGY | Facility: CLINIC | Age: 58
End: 2023-11-13
Payer: COMMERCIAL

## 2023-11-13 DIAGNOSIS — H35.30 MACULAR DEGENERATION OF RIGHT EYE, UNSPECIFIED TYPE: ICD-10-CM

## 2023-11-13 DIAGNOSIS — Z96.1 BILATERAL PSEUDOPHAKIA: ICD-10-CM

## 2023-11-13 DIAGNOSIS — H52.7 REFRACTION ERROR: ICD-10-CM

## 2023-11-13 DIAGNOSIS — E11.9 TYPE 2 DIABETES MELLITUS WITHOUT COMPLICATION, WITHOUT LONG-TERM CURRENT USE OF INSULIN (MULTI): Primary | ICD-10-CM

## 2023-11-13 PROCEDURE — 92015 DETERMINE REFRACTIVE STATE: CPT | Performed by: OPHTHALMOLOGY

## 2023-11-13 PROCEDURE — 99214 OFFICE O/P EST MOD 30 MIN: CPT | Performed by: OPHTHALMOLOGY

## 2023-11-13 RX ORDER — BUPROPION HYDROCHLORIDE 150 MG/1
150 TABLET ORAL DAILY
COMMUNITY
End: 2023-11-16 | Stop reason: SDUPTHER

## 2023-11-13 ASSESSMENT — PAIN SCALES - GENERAL: PAINLEVEL: 0-NO PAIN

## 2023-11-13 ASSESSMENT — SLIT LAMP EXAM - LIDS
COMMENTS: NORMAL
COMMENTS: NORMAL

## 2023-11-13 ASSESSMENT — KERATOMETRY
OS_AXISANGLE_DEGREES: 100
OS_K2POWER_DIOPTERS: 44.75
OD_K1POWER_DIOPTERS: 43.25
OS_K1POWER_DIOPTERS: 43.50
OS_AXISANGLE2_DEGREES: 10
OD_AXISANGLE2_DEGREES: 155
OD_AXISANGLE_DEGREES: 65
OD_K2POWER_DIOPTERS: 44.25
METHOD_AUTO_MANUAL: AUTOMATED

## 2023-11-13 ASSESSMENT — ENCOUNTER SYMPTOMS
OCCASIONAL FEELINGS OF UNSTEADINESS: 1
MUSCULOSKELETAL NEGATIVE: 0
PSYCHIATRIC NEGATIVE: 0
CONSTITUTIONAL NEGATIVE: 0
ENDOCRINE NEGATIVE: 0
CARDIOVASCULAR NEGATIVE: 0
ALLERGIC/IMMUNOLOGIC NEGATIVE: 0
RESPIRATORY NEGATIVE: 0
HEMATOLOGIC/LYMPHATIC NEGATIVE: 0
EYES NEGATIVE: 0
LOSS OF SENSATION IN FEET: 1
NEUROLOGICAL NEGATIVE: 0
DEPRESSION: 0
GASTROINTESTINAL NEGATIVE: 0

## 2023-11-13 ASSESSMENT — PATIENT HEALTH QUESTIONNAIRE - PHQ9
SUM OF ALL RESPONSES TO PHQ9 QUESTIONS 1 AND 2: 0
1. LITTLE INTEREST OR PLEASURE IN DOING THINGS: NOT AT ALL
2. FEELING DOWN, DEPRESSED OR HOPELESS: NOT AT ALL

## 2023-11-13 ASSESSMENT — REFRACTION_MANIFEST
OD_AXIS: 150
OD_CYLINDER: -1.00
OD_SPHERE: -0.00
OS_SPHERE: -0.00
OS_AXIS: 015
OS_CYLINDER: -1.00

## 2023-11-13 ASSESSMENT — VISUAL ACUITY
OS_SC: 20/30
OD_SC+: -1
OS_SC+: -1
METHOD: SNELLEN - SINGLE
OD_SC: 20/40

## 2023-11-13 ASSESSMENT — EXTERNAL EXAM - LEFT EYE: OS_EXAM: NORMAL

## 2023-11-13 ASSESSMENT — CUP TO DISC RATIO
OS_RATIO: 0.3
OD_RATIO: 0.3

## 2023-11-13 ASSESSMENT — TONOMETRY
OS_IOP_MMHG: 18
IOP_METHOD: GOLDMANN APPLANATION
OD_IOP_MMHG: 19

## 2023-11-13 ASSESSMENT — REFRACTION_WEARINGRX
OS_SPHERE: +2.50
SPECS_TYPE: OTC READERS
OD_SPHERE: +2.50

## 2023-11-13 ASSESSMENT — EXTERNAL EXAM - RIGHT EYE: OD_EXAM: NORMAL

## 2023-11-13 NOTE — ASSESSMENT & PLAN NOTE
Stable sulcus intraocular lens (IOL) right eye (OD) and posterior chamber intraocular lens (PCIOL) left eye (OS). Advised will continue to monitor with serial exam. Has few vitreous strands inferior right eye (OD) but no need for YAG at this point.

## 2023-11-13 NOTE — ASSESSMENT & PLAN NOTE
Stable posterior changes right eye (OD), advised will continue to monitor with serial exam. Suspect more chronic than on age-related macular degeneration (AMD) spectrum.

## 2023-11-13 NOTE — PATIENT INSTRUCTIONS
Thank you so much for choosing me to provide your care today!    If you were dilated your vision may remain blurry   or light sensitive for several hours.    The nature of eye and vision problems can require frequent follow up, please make every effort to adhere to any future appointments.    If you have any issues, questions, or concerns,   please do not hesitate to reach out.    If you receive a survey in regards to your care today, please mention any exceptional care my office staff and/or technicians provided.    You can reach our office at this number:  [phone]

## 2023-11-13 NOTE — LETTER
November 13, 2023    Danish Koch DO  [address]  [address]  [facility] [address]    Patient: Serenity Russell   YOB: 1965   Date of Visit: 11/13/2023       Dear Dr. Danish Koch DO:    Thank you for referring Serenity Russell to me for evaluation. Here is my assessment and plan of care:    Assessment/Plan:  Serenity was seen today for annual exam and decreased visual acuity.  Diagnoses and all orders for this visit:  Type 2 diabetes mellitus without complication, without long-term current use of insulin (CMS/[facility]) (Primary)  Bilateral pseudophakia  Macular degeneration of right eye, unspecified type  Refraction error      Right eye:     Left eye:    [x] No diabetes mellitus (DM) retinopathy [x] No diabetes mellitus (DM) retinopathy    [] Mild non-proliferative retinopathy [] Mild non-proliferative retinopathy    [] Moderate non-proliferative retinopathy [] Moderate non-proliferative retinopathy    [] Severe non-proliferative retinopathy [] Severe non-proliferative retinopathy    [] Proliferative retinopathy   [] Proliferative retinopathy    [] Diabetic macular edema   [] Diabetic macular edema    Urged patient to continue to work on best blood sugar and blood pressure control  Advised patient to call if any new vision changes noted    Will plan to repeat evaluation in:   [] 3 months [] 6 months [] 9 months [x] 12 months [] Other      Below you can find relevant pieces of the exam. If you have questions, please do not hesitate to call me. I look forward to following Serenity along with you.         Sincerely,        Presley Giron MD        CC:   No Recipients         External Exam         Right Left    External Normal Normal              Slit Lamp Exam         Right Left    Lids/Lashes Normal Normal    Conjunctiva/Sclera White and quiet White and quiet    Cornea incision site looks good incision site looks good    Anterior Chamber Inferior Vitreous strands trace cell    Iris Round  "and reactive Round and reactive    Lens posterior chamber intraocular lens (IOL), sulcus placement with slight inferior nasal displacement posterior chamber IOL              Fundus Exam         Right Left    Vitreous vitreous clear and normal vitreous clear and normal    Disc Normal Normal    C/D Ratio 0.3 0.3    Macula drusenoid mottling no diabetic retinopathy changes    Vessels no proliferative diabetic retinopathy no proliferative diabetic retinopathy    Periphery no peripheral neovascularization no peripheral neovascularization                   <div id=\"MAIN_EXAM_REVIEWED\"></div>     "

## 2023-11-13 NOTE — PROGRESS NOTES
Assessment/Plan   Problem List Items Addressed This Visit          Eye/Vision problems    Bilateral pseudophakia     Stable sulcus intraocular lens (IOL) right eye (OD) and posterior chamber intraocular lens (PCIOL) left eye (OS). Advised will continue to monitor with serial exam. Has few vitreous strands inferior right eye (OD) but no need for YAG at this point.          Macular degeneration     Stable posterior changes right eye (OD), advised will continue to monitor with serial exam. Suspect more chronic than on age-related macular degeneration (AMD) spectrum.          Type 2 diabetes mellitus (CMS/[facility]) - Primary     Advised no signs of diabetic changes on exam. Recommend to continue best sugar control and on need for annual checkup. Understands role of good BP control and weight loss if applicable. Discussed some components of selected studies like WESDR, DCCT, and UKPDS to describe the likely course of diabetes and effects on the eyes.           Refraction error     Discussed glasses prescription from refraction. Will provide if patient interested in keeping for records or to fill as a new set of glasses.               Provided reassurance regarding above diagnoses and care received in the office visit today. Discussed outcomes and options along with the importance of treatment compliance. Understands the importance of any follow up visits. Patient instructed to call/communicate with our office if any new issues, questions, or concerns.     Will plan to see back in 1 year for full or sooner PRN

## 2023-11-15 ENCOUNTER — HOSPITAL ENCOUNTER (OUTPATIENT)
Dept: RADIOLOGY | Facility: HOSPITAL | Age: 58
Discharge: HOME | End: 2023-11-15
Payer: COMMERCIAL

## 2023-11-15 DIAGNOSIS — R10.9 UNSPECIFIED ABDOMINAL PAIN: ICD-10-CM

## 2023-11-15 PROCEDURE — 78264 GASTRIC EMPTYING IMG STUDY: CPT | Performed by: STUDENT IN AN ORGANIZED HEALTH CARE EDUCATION/TRAINING PROGRAM

## 2023-11-15 PROCEDURE — 78264 GASTRIC EMPTYING IMG STUDY: CPT

## 2023-11-15 PROCEDURE — 3430000001 HC RX 343 DIAGNOSTIC RADIOPHARMACEUTICALS: Performed by: PHYSICIAN ASSISTANT

## 2023-11-15 RX ADMIN — Medication 1 MILLICURIE: at 09:54

## 2023-11-16 ENCOUNTER — TELEMEDICINE (OUTPATIENT)
Dept: BEHAVIORAL HEALTH | Facility: CLINIC | Age: 58
End: 2023-11-16
Payer: COMMERCIAL

## 2023-11-16 DIAGNOSIS — F33.1 MODERATE EPISODE OF RECURRENT MAJOR DEPRESSIVE DISORDER (MULTI): Primary | ICD-10-CM

## 2023-11-16 DIAGNOSIS — F43.10 PTSD (POST-TRAUMATIC STRESS DISORDER): ICD-10-CM

## 2023-11-16 DIAGNOSIS — G47.00 INSOMNIA, UNSPECIFIED TYPE: ICD-10-CM

## 2023-11-16 DIAGNOSIS — F41.1 GAD (GENERALIZED ANXIETY DISORDER): ICD-10-CM

## 2023-11-16 PROCEDURE — 99214 OFFICE O/P EST MOD 30 MIN: CPT | Performed by: PSYCHIATRY & NEUROLOGY

## 2023-11-16 RX ORDER — BUPROPION HYDROCHLORIDE 150 MG/1
150 TABLET ORAL DAILY
Qty: 30 TABLET | Refills: 2 | Status: SHIPPED | OUTPATIENT
Start: 2023-11-16 | End: 2024-01-19 | Stop reason: SDUPTHER

## 2023-11-16 RX ORDER — ESCITALOPRAM OXALATE 20 MG/1
TABLET ORAL
Qty: 30 TABLET | Refills: 2 | Status: SHIPPED | OUTPATIENT
Start: 2023-11-16 | End: 2024-01-19

## 2023-11-16 RX ORDER — TRAZODONE HYDROCHLORIDE 100 MG/1
100 TABLET ORAL NIGHTLY
Qty: 30 TABLET | Refills: 2 | Status: SHIPPED | OUTPATIENT
Start: 2023-11-16 | End: 2024-01-19 | Stop reason: SDUPTHER

## 2023-11-16 ASSESSMENT — ENCOUNTER SYMPTOMS
NERVOUS/ANXIOUS: 1
DYSPHORIC MOOD: 1

## 2023-11-16 NOTE — PROGRESS NOTES
"Adult Ambulatory Psychiatry Progress Note      Assessment/Plan     Impression:  Serenity Russell is a 58 y.o. female domiciled with fiance and his son, unemployed who presents for follow up with CC of Depression, Anxiety, and PTSD (Post-Traumatic Stress Disorder). Reviewed SE for escitalopram including N/V/D/HA/dizziness/appetite change/sexual SE.        Plan:   Anxiety/depression/ PTSD - taper and stop paroxetine, start escitalopram 10mg daily for 2 weeks and increase to 20mg, wellbutrin XL 150mg daily, c/w therapy, c/w med ed, psycho ed and supportive psychotherapy, f/u 2 months  Insomnia - pt uses medical marijuana gummies, trazodone 50mg qhs prn       Subjective   HPI:  Pt presented on time. Mood \"ok\" since last session. She's had some days with depressed mood she attributes to the change in weather. Denies SI. Anxiety is higher. She had 3 panic attacks. One was in the grocery store and one at home She has a supply of ativan and took it for 1 of them. She is looking for a job but can't attribute it to anything else. She left the job at [employer] in May because of the coworkers there. She started looking for new jobs this September. Appetite ok. Taking medicine as prescribed. Denies significant SE. She doesn't feel like the medicines are as helpful as they used to. She thinks menopause is mostly over and the hot flashes are minimal. Pt is open to trying to replace paxil. She used to take lexapro which was helpful. She's open to returning to that.           Review of Systems   Psychiatric/Behavioral:  Positive for dysphoric mood. Negative for suicidal ideas. The patient is nervous/anxious.            Objective   Mental Status Exam:  General Appearance: Well groomed, appropriate eye contact  Attitude/Behavior: Cooperative  Motor: No psychomotor agitation or retardation, no tremor or other abnormal movements  Speech: Normal rate, volume, prosody  Mood: \"ok\"  Affect: Dysphoric, constricted but reactive  Thought " Process: Linear, goal directed  Thought Associations: No loosening of associations  Thought Content: Normal  Perception: No perceptual abnormalities noted  Insight: Intact  Judgement: Intact    Vitals:  There were no vitals filed for this visit.    Current Medications:  Current Outpatient Medications on File Prior to Visit   Medication Sig Dispense Refill    cetirizine (ZyrTEC) 10 mg tablet Take 1 tablet (10 mg) by mouth once daily as needed.      empagliflozin (Jardiance) 25 mg Take 1 tablet (25 mg) by mouth once daily.      levothyroxine (Synthroid, Levoxyl) 100 mcg tablet Take 1 tablet (100 mcg) by mouth once daily in the morning. Take before meals.      lisinopril 2.5 mg tablet Take 1 tablet (2.5 mg) by mouth once daily.      LORazepam (Ativan) 0.5 mg tablet Take 1 tablet (0.5 mg) by mouth once daily as needed for anxiety.      neomycin-polymyxin-HC (Cortisporin) otic solution Administer 2 drops into each ear 4 times a day for 7 days. 2.8 mL 0    Trulicity 1.5 mg/0.5 mL pen injector injection Inject 1.5 mg under the skin 1 (one) time per week.      [DISCONTINUED] buPROPion XL (Wellbutrin XL) 150 mg 24 hr tablet Take 1 tablet (150 mg) by mouth once daily. Do not crush, chew, or split.      [DISCONTINUED] ciprofloxacin-hydrocortisone (Cipro HC) otic suspension Administer 3 drops into each ear 2 times a day for 7 days. 10 mL 0    [DISCONTINUED] PARoxetine (Paxil) 30 mg tablet Take 2 tablets (60 mg) by mouth once daily in the morning.      [DISCONTINUED] traZODone (Desyrel) 100 mg tablet Take 1 tablet (100 mg) by mouth once daily at bedtime.       No current facility-administered medications on file prior to visit.         Orders:  Diagnoses and all orders for this visit:  Moderate episode of recurrent major depressive disorder (CMS/HCC)  -     escitalopram (Lexapro) 20 mg tablet; TAKE 1/2 TABLET DAILY FOR 2 WEEKS, THEN TAKE 1 TABLET DAILY  -     buPROPion XL (Wellbutrin XL) 150 mg 24 hr tablet; Take 1 tablet (150  mg) by mouth once daily. Do not crush, chew, or split.  JUDITH (generalized anxiety disorder)  -     escitalopram (Lexapro) 20 mg tablet; TAKE 1/2 TABLET DAILY FOR 2 WEEKS, THEN TAKE 1 TABLET DAILY  PTSD (post-traumatic stress disorder)  -     escitalopram (Lexapro) 20 mg tablet; TAKE 1/2 TABLET DAILY FOR 2 WEEKS, THEN TAKE 1 TABLET DAILY  Insomnia, unspecified type  -     traZODone (Desyrel) 100 mg tablet; Take 1 tablet (100 mg) by mouth once daily at bedtime.          Time Spent:    Prep time: 3  Direct patient time: 22  Documentation time: 5  Total time: 30 min    Next Appointment:  Follow up in 2 months (on 1/19/2024).

## 2023-11-16 NOTE — PATIENT INSTRUCTIONS
Reduce paroxetine to 1.5 tablets daily for 7 days, then reduce to 1 tablet daily for 7 days and then reduce to 1/2 tablet daily for 7 days and stop. Then when finished, start escitalopram 1/2 tablet for 2 weeks, then take the whole tablet.

## 2023-11-17 ENCOUNTER — APPOINTMENT (OUTPATIENT)
Dept: PRIMARY CARE | Facility: CLINIC | Age: 58
End: 2023-11-17
Payer: COMMERCIAL

## 2023-11-25 ASSESSMENT — ENCOUNTER SYMPTOMS
FATIGUE: 1
DIZZINESS: 1

## 2023-11-27 ENCOUNTER — OFFICE VISIT (OUTPATIENT)
Dept: PRIMARY CARE | Facility: CLINIC | Age: 58
End: 2023-11-27
Payer: COMMERCIAL

## 2023-11-27 VITALS
HEIGHT: 63 IN | OXYGEN SATURATION: 96 % | RESPIRATION RATE: 16 BRPM | TEMPERATURE: 97.5 F | BODY MASS INDEX: 33.31 KG/M2 | SYSTOLIC BLOOD PRESSURE: 100 MMHG | WEIGHT: 188 LBS | DIASTOLIC BLOOD PRESSURE: 62 MMHG | HEART RATE: 75 BPM

## 2023-11-27 DIAGNOSIS — H69.91 EUSTACHIAN TUBE DYSFUNCTION, RIGHT: Primary | ICD-10-CM

## 2023-11-27 DIAGNOSIS — G56.02 CARPAL TUNNEL SYNDROME ON LEFT: ICD-10-CM

## 2023-11-27 DIAGNOSIS — E11.9 TYPE 2 DIABETES MELLITUS WITHOUT COMPLICATION, WITHOUT LONG-TERM CURRENT USE OF INSULIN (MULTI): ICD-10-CM

## 2023-11-27 LAB
CREAT UR-MCNC: 98.5 MG/DL
MICROALBUMIN UR-MCNC: 16 MG/L (ref 0–23)
MICROALBUMIN/CREAT UR: 16.2 UG/MG CREAT
POC HEMOGLOBIN A1C: 6.3 % (ref 4.2–6.5)

## 2023-11-27 PROCEDURE — 3078F DIAST BP <80 MM HG: CPT | Performed by: FAMILY MEDICINE

## 2023-11-27 PROCEDURE — 99214 OFFICE O/P EST MOD 30 MIN: CPT | Performed by: FAMILY MEDICINE

## 2023-11-27 PROCEDURE — 3008F BODY MASS INDEX DOCD: CPT | Performed by: FAMILY MEDICINE

## 2023-11-27 PROCEDURE — 3061F NEG MICROALBUMINURIA REV: CPT | Performed by: FAMILY MEDICINE

## 2023-11-27 PROCEDURE — 1036F TOBACCO NON-USER: CPT | Performed by: FAMILY MEDICINE

## 2023-11-27 PROCEDURE — 83036 HEMOGLOBIN GLYCOSYLATED A1C: CPT | Performed by: FAMILY MEDICINE

## 2023-11-27 PROCEDURE — 82570 ASSAY OF URINE CREATININE: CPT | Performed by: FAMILY MEDICINE

## 2023-11-27 PROCEDURE — 4010F ACE/ARB THERAPY RXD/TAKEN: CPT | Performed by: FAMILY MEDICINE

## 2023-11-27 PROCEDURE — 3074F SYST BP LT 130 MM HG: CPT | Performed by: FAMILY MEDICINE

## 2023-11-27 ASSESSMENT — ENCOUNTER SYMPTOMS
LOSS OF SENSATION IN FEET: 0
OCCASIONAL FEELINGS OF UNSTEADINESS: 1
DIZZINESS: 1
DEPRESSION: 0
FATIGUE: 1

## 2023-11-27 ASSESSMENT — PAIN SCALES - GENERAL: PAINLEVEL: 0-NO PAIN

## 2023-11-27 NOTE — PROGRESS NOTES
Answers submitted by the patient for this visit:  Diabetes Questionnaire (Submitted on 11/25/2023)  Chief Complaint: Diabetes problem  Diabetes type: type 2  MedicAlert ID: No  Disease duration: 5 Years  fatigue: Yes  foot paresthesias: Yes  dizziness: Yes  sleepiness: Yes  peripheral neuropathy: Yes  CAD risks: family history, obesity, post-menopausal, sedentary lifestyle, stress  Current treatments: oral agent (dual therapy)  Treatment compliance: all of the time  Home blood tests: 1-2 x per week  Monitoring compliance: adequate  Blood glucose trend: fluctuating minimally  breakfast glucose level: 130-140  High score: 130-140  Overall: 130-140  Current diet: diabetic  Meal planning: carbohydrate counting  Exercise: intermittently  Dietitian visit: No  Eye exam current: Yes  Sees podiatrist: No

## 2023-11-27 NOTE — PROGRESS NOTES
"Subjective   Patient ID: Serenity Russell is a 58 y.o. female who presents for Diabetes (Pt is here for DM check and would like to discuss her left hand pain, pt has concerns of carpal tunnel. Pts A1C is 6.3% today.).    Diabetes  She has type 2 diabetes mellitus. No MedicAlert identification noted. The initial diagnosis of diabetes was made 5 years ago. Hypoglycemia symptoms include dizziness and sleepiness. Associated symptoms include fatigue and foot paresthesias. Diabetic complications include peripheral neuropathy. Risk factors for coronary artery disease include family history, obesity, post-menopausal, sedentary lifestyle and stress. Current diabetic treatment includes oral agent (dual therapy). She is compliant with treatment all of the time. She is following a diabetic diet. Meal planning includes carbohydrate counting. She has not had a previous visit with a dietitian. She participates in exercise intermittently. She monitors blood glucose at home 1-2 x per week. Blood glucose monitoring compliance is adequate. Her home blood glucose trend is fluctuating minimally. Her breakfast blood glucose range is generally 130-140 mg/dl. Her overall blood glucose range is 130-140 mg/dl. She does not see a podiatrist.Eye exam is current.    last A1c was 6.5.  her home glucose readings are less than 140 average.      Review of Systems   Constitutional:  Positive for fatigue.   Neurological:  Positive for dizziness.       Objective   /62 (BP Location: Left arm, Patient Position: Sitting, BP Cuff Size: Adult)   Pulse 75   Temp 36.4 °C (97.5 °F) (Temporal)   Resp 16   Ht 1.6 m (5' 3\")   Wt 85.3 kg (188 lb)   SpO2 96%   BMI 33.30 kg/m²     Physical Exam  Constitutional:       General: She is not in acute distress.     Appearance: Normal appearance.   Cardiovascular:      Rate and Rhythm: Normal rate and regular rhythm.      Heart sounds: No murmur heard.  Pulmonary:      Breath sounds: Normal breath sounds. No " wheezing.   Neurological:      Mental Status: She is alert.         Assessment/Plan   Problem List Items Addressed This Visit             ICD-10-CM    Type 2 diabetes mellitus (CMS/HCC) E11.9    Relevant Orders    POCT glycosylated hemoglobin (Hb A1C) manually resulted (Completed)    Albumin, urine, random     Other Visit Diagnoses         Codes    Eustachian tube dysfunction, right    -  Primary H69.91    Carpal tunnel syndrome on left     G56.02        Night splints for 4-6 wks and call if not resolving for further eval  Will use flonase otc for ET dysfunction.   Continue Trulicity and recheck 3 months.

## 2023-12-27 ENCOUNTER — OFFICE VISIT (OUTPATIENT)
Dept: GASTROENTEROLOGY | Facility: CLINIC | Age: 58
End: 2023-12-27
Payer: COMMERCIAL

## 2023-12-27 VITALS — WEIGHT: 192 LBS | HEIGHT: 63 IN | BODY MASS INDEX: 34.02 KG/M2

## 2023-12-27 DIAGNOSIS — R11.2 NAUSEA AND VOMITING, UNSPECIFIED VOMITING TYPE: Primary | ICD-10-CM

## 2023-12-27 PROCEDURE — 3008F BODY MASS INDEX DOCD: CPT | Performed by: PHYSICIAN ASSISTANT

## 2023-12-27 PROCEDURE — 4010F ACE/ARB THERAPY RXD/TAKEN: CPT | Performed by: PHYSICIAN ASSISTANT

## 2023-12-27 PROCEDURE — 3061F NEG MICROALBUMINURIA REV: CPT | Performed by: PHYSICIAN ASSISTANT

## 2023-12-27 PROCEDURE — 99214 OFFICE O/P EST MOD 30 MIN: CPT | Performed by: PHYSICIAN ASSISTANT

## 2023-12-27 PROCEDURE — 1036F TOBACCO NON-USER: CPT | Performed by: PHYSICIAN ASSISTANT

## 2023-12-27 RX ORDER — NORTRIPTYLINE HYDROCHLORIDE 10 MG/1
10 CAPSULE ORAL NIGHTLY
Qty: 30 CAPSULE | Refills: 11 | Status: SHIPPED | OUTPATIENT
Start: 2023-12-27 | End: 2024-05-16 | Stop reason: ALTCHOICE

## 2023-12-27 NOTE — PROGRESS NOTES
Subjective   Patient ID: Serenity Russell is a 58 y.o. female who presents for Follow-up (Gastric emptying study).  HPI  58-year-old female presents to GI clinic for follow-up constipation, nausea/vomiting.  She reports that overall her constipation is better.  She is moving her bowels approximately once per day.  She has increased her fiber and vegetable intake which she feels like has helped.  Last couple days she has had some diarrhea but she attributes this to eating more greasy/fatty and rich foods due to the David season.  She reports that her nausea and vomiting is better by about 50% on PPI.  She is taking this once per day.  She does not feel like she is having any further GERD symptoms.  She does smoke marijuana daily.  She does have her gallbladder, on CT scan in August 2023 this was noted to be contracted but not overtly abnormal.  Her gastric emptying study was within normal limits.  Her colonoscopy in October 2023 was also normal.  She had an EGD with Dr. Conway earlier in 2023 which she reports was completely normal.  Patient reports that she has always had a nervous stomach.  She reports that when she was little she had issues with vomiting.  She notes that when she was in [place] on vacation she had no abdominal pain or GI symptoms which makes her believe that a lot of her symptoms are due to stress and day-to-day life.  Abdominal pain-No      Bloating-Yes  Abdominal swelling-No     Burping-No  Trouble swallowing-No  Painful swallowing-No     Feeling full after small meal-Yes     Heartburn-Yes      Nausea-Yes     Regurgitation-Yes  Vomiting-Yes  Vomiting blood-No  Vomiting coffee grounds-No    Constipation-Yes  Diarrhea-Yes  Fecal incontinence-No  Fecal urgency-No   BRBPR-No  Black stools-No  Maroon stools-No   Unintentional weight loss-No   Have you had a Colonoscopy-Yes 10/9/23  Have you had an EGD-Yes     Objective   Physical Exam  Constitutional: well nourished, well appearing. NAD. Alert  and cooperative  Skin: no jaundice   Eyes: anicteric, normal conjunctiva  ENT: MMM  Pulmonary: easy and nonlabored on RA  Abdomen: soft, NT, ND. No ascites.  MSK: MAEx4  Extremities: no edema  Neuro: aaox3  Psych: appropriate mood and behavior     No visits with results within 1 Month(s) from this visit.   Latest known visit with results is:   Office Visit on 11/27/2023   Component Date Value Ref Range Status    POC HEMOGLOBIN A1c 11/27/2023 6.3  4.2 - 6.5 % Final    Albumin, Urine Random 11/27/2023 16.0  0.0 - 23.0 mg/L Final    Creatinine, Urine Random 11/27/2023 98.5  NOT ESTABLISHED mg/dL Final    Albumin/Creatine Ratio 11/27/2023 16.2  ug/mg Creat Final     I personally reviewed her colonoscopy report from October 2023, I have also reviewed gastric emptying study which was ordered from November 2023  Assessment/Plan     58-year-old female presents to GI clinic for follow-up constipation, N/V. Constipation improved with diet. N/V improved 50% with PPI. She does note that stress/nerves have been a trigger.     -HIDA scan ordered to further evaluate GB  -Cont PPI daily  -Start low dose nortriptyline 10mg at bedtime. Advised to monitor for fatigue, heart palpitations, dizziness, tremors. Call if any side effects noted

## 2024-01-12 ENCOUNTER — HOSPITAL ENCOUNTER (OUTPATIENT)
Dept: RADIOLOGY | Facility: HOSPITAL | Age: 59
Discharge: HOME | End: 2024-01-12
Payer: COMMERCIAL

## 2024-01-12 DIAGNOSIS — R11.2 NAUSEA AND VOMITING, UNSPECIFIED VOMITING TYPE: ICD-10-CM

## 2024-01-12 PROCEDURE — 3430000001 HC RX 343 DIAGNOSTIC RADIOPHARMACEUTICALS: Performed by: PHYSICIAN ASSISTANT

## 2024-01-12 PROCEDURE — A9537 TC99M MEBROFENIN: HCPCS | Performed by: PHYSICIAN ASSISTANT

## 2024-01-12 PROCEDURE — 78227 HEPATOBIL SYST IMAGE W/DRUG: CPT

## 2024-01-12 PROCEDURE — 2500000004 HC RX 250 GENERAL PHARMACY W/ HCPCS (ALT 636 FOR OP/ED): Performed by: PHYSICIAN ASSISTANT

## 2024-01-12 RX ORDER — KIT FOR THE PREPARATION OF TECHNETIUM TC 99M MEBROFENIN 45 MG/10ML
5 INJECTION, POWDER, LYOPHILIZED, FOR SOLUTION INTRAVENOUS
Status: COMPLETED | OUTPATIENT
Start: 2024-01-12 | End: 2024-01-12

## 2024-01-12 RX ORDER — SINCALIDE 5 UG/5ML
1.8 INJECTION, POWDER, LYOPHILIZED, FOR SOLUTION INTRAVENOUS
Status: COMPLETED | OUTPATIENT
Start: 2024-01-12 | End: 2024-01-12

## 2024-01-12 RX ADMIN — KIT FOR THE PREPARATION OF TECHNETIUM TC 99M MEBROFENIN 5 MILLICURIE: 45 INJECTION, POWDER, LYOPHILIZED, FOR SOLUTION INTRAVENOUS at 11:45

## 2024-01-12 RX ADMIN — SINCALIDE 1.8 MCG: 5 INJECTION, POWDER, LYOPHILIZED, FOR SOLUTION INTRAVENOUS at 13:25

## 2024-01-19 ENCOUNTER — TELEMEDICINE (OUTPATIENT)
Dept: BEHAVIORAL HEALTH | Facility: CLINIC | Age: 59
End: 2024-01-19
Payer: COMMERCIAL

## 2024-01-19 DIAGNOSIS — F33.1 MODERATE EPISODE OF RECURRENT MAJOR DEPRESSIVE DISORDER (MULTI): ICD-10-CM

## 2024-01-19 DIAGNOSIS — F43.10 PTSD (POST-TRAUMATIC STRESS DISORDER): ICD-10-CM

## 2024-01-19 DIAGNOSIS — G47.00 INSOMNIA, UNSPECIFIED TYPE: ICD-10-CM

## 2024-01-19 DIAGNOSIS — F41.1 GAD (GENERALIZED ANXIETY DISORDER): ICD-10-CM

## 2024-01-19 PROCEDURE — 99214 OFFICE O/P EST MOD 30 MIN: CPT | Performed by: PSYCHIATRY & NEUROLOGY

## 2024-01-19 RX ORDER — TRAZODONE HYDROCHLORIDE 100 MG/1
100 TABLET ORAL NIGHTLY
Qty: 30 TABLET | Refills: 2 | Status: SHIPPED | OUTPATIENT
Start: 2024-01-19 | End: 2024-05-16 | Stop reason: SDUPTHER

## 2024-01-19 RX ORDER — BUPROPION HYDROCHLORIDE 150 MG/1
150 TABLET ORAL DAILY
Qty: 30 TABLET | Refills: 2 | Status: SHIPPED | OUTPATIENT
Start: 2024-01-19 | End: 2024-05-16 | Stop reason: SDUPTHER

## 2024-01-19 RX ORDER — VENLAFAXINE HYDROCHLORIDE 75 MG/1
75 CAPSULE, EXTENDED RELEASE ORAL DAILY
Qty: 30 CAPSULE | Refills: 1 | Status: SHIPPED | OUTPATIENT
Start: 2024-01-19 | End: 2024-02-23 | Stop reason: SDUPTHER

## 2024-01-19 ASSESSMENT — PATIENT HEALTH QUESTIONNAIRE - PHQ9
1. LITTLE INTEREST OR PLEASURE IN DOING THINGS: MORE THAN HALF THE DAYS
8. MOVING OR SPEAKING SO SLOWLY THAT OTHER PEOPLE COULD HAVE NOTICED. OR THE OPPOSITE, BEING SO FIGETY OR RESTLESS THAT YOU HAVE BEEN MOVING AROUND A LOT MORE THAN USUAL: NOT AT ALL
10. IF YOU CHECKED OFF ANY PROBLEMS, HOW DIFFICULT HAVE THESE PROBLEMS MADE IT FOR YOU TO DO YOUR WORK, TAKE CARE OF THINGS AT HOME, OR GET ALONG WITH OTHER PEOPLE: SOMEWHAT DIFFICULT
3. TROUBLE FALLING OR STAYING ASLEEP: NEARLY EVERY DAY
9. THOUGHTS THAT YOU WOULD BE BETTER OFF DEAD, OR OF HURTING YOURSELF: SEVERAL DAYS
5. POOR APPETITE OR OVEREATING: MORE THAN HALF THE DAYS
SUM OF ALL RESPONSES TO PHQ QUESTIONS 1-9: 18
6. FEELING BAD ABOUT YOURSELF - OR THAT YOU ARE A FAILURE OR HAVE LET YOURSELF OR YOUR FAMILY DOWN: NEARLY EVERY DAY
SUM OF ALL RESPONSES TO PHQ9 QUESTIONS 1 & 2: 4
7. TROUBLE CONCENTRATING ON THINGS, SUCH AS READING THE NEWSPAPER OR WATCHING TELEVISION: MORE THAN HALF THE DAYS
2. FEELING DOWN, DEPRESSED OR HOPELESS: MORE THAN HALF THE DAYS
4. FEELING TIRED OR HAVING LITTLE ENERGY: NEARLY EVERY DAY

## 2024-01-19 ASSESSMENT — ENCOUNTER SYMPTOMS
NERVOUS/ANXIOUS: 1
DYSPHORIC MOOD: 1

## 2024-01-19 NOTE — PROGRESS NOTES
"Adult Ambulatory Psychiatry Progress Note      Assessment/Plan     Impression:  Serenity Russell is a 58 y.o. female domiciled with fiance and his son, unemployed who presents for follow up with CC of Anxiety, Depression, and PTSD (Post-Traumatic Stress Disorder). Reviewed SE for venlafaxine including N/V/D/HA/dizziness/appetite change/sexual SE/HTN/discontinuation syndrome.        Plan:   Anxiety/depression/ PTSD - taper and stop escitalopram, start venlafaxine ER 75mg daily, wellbutrin XL 150mg daily, c/w therapy, c/w med ed, psycho ed and supportive psychotherapy, f/u 2 months  Insomnia - pt uses medical marijuana gummies, trazodone 50mg qhs prn       Subjective   HPI:  Pt arrived on time. She was able to stop the paxil and start the lexapro. She doesn't see a difference with it. She's tried prozac, zoloft, lexapro, paxil. She hasn't tried SNRIs. Denies chronic pain. PHQ9 18. Anxiety is still high. Discussed effexor.           Review of Systems   Psychiatric/Behavioral:  Positive for dysphoric mood. Negative for suicidal ideas. The patient is nervous/anxious.            Objective   Mental Status Exam:  General Appearance: Well groomed, appropriate eye contact  Attitude/Behavior: Cooperative  Motor: No psychomotor agitation or retardation, no tremor or other abnormal movements  Speech: Normal rate, volume, prosody  Mood: \"the same\"  Affect: Dysphoric, constricted  Thought Process: Linear, goal directed  Thought Associations: No loosening of associations  Thought Content: Normal  Perception: No perceptual abnormalities noted  Insight: Intact  Judgement: Intact      Vitals:  There were no vitals filed for this visit.    Current Medications:  Current Outpatient Medications on File Prior to Visit   Medication Sig Dispense Refill    cetirizine (ZyrTEC) 10 mg tablet Take 1 tablet (10 mg) by mouth once daily as needed.      empagliflozin (Jardiance) 25 mg Take 1 tablet (25 mg) by mouth once daily.      levothyroxine " (Synthroid, Levoxyl) 100 mcg tablet Take 1 tablet (100 mcg) by mouth once daily in the morning. Take before meals.      lisinopril 2.5 mg tablet Take 1 tablet (2.5 mg) by mouth once daily.      LORazepam (Ativan) 0.5 mg tablet Take 1 tablet (0.5 mg) by mouth once daily as needed for anxiety.      nortriptyline (Pamelor) 10 mg capsule Take 1 capsule (10 mg) by mouth once daily at bedtime. 30 capsule 11    Trulicity 1.5 mg/0.5 mL pen injector injection Inject 1.5 mg under the skin 1 (one) time per week.      [DISCONTINUED] buPROPion XL (Wellbutrin XL) 150 mg 24 hr tablet Take 1 tablet (150 mg) by mouth once daily. Do not crush, chew, or split. 30 tablet 2    [DISCONTINUED] escitalopram (Lexapro) 20 mg tablet TAKE 1/2 TABLET DAILY FOR 2 WEEKS, THEN TAKE 1 TABLET DAILY 30 tablet 2    [DISCONTINUED] traZODone (Desyrel) 100 mg tablet Take 1 tablet (100 mg) by mouth once daily at bedtime. 30 tablet 2     No current facility-administered medications on file prior to visit.       Lab Review:   Office Visit on 11/27/2023   Component Date Value    POC HEMOGLOBIN A1c 11/27/2023 6.3     Albumin, Urine Random 11/27/2023 16.0     Creatinine, Urine Random 11/27/2023 98.5     Albumin/Creatine Ratio 11/27/2023 16.2        Orders:  Diagnoses and all orders for this visit:  Moderate episode of recurrent major depressive disorder (CMS/HCC)  -     venlafaxine XR (Effexor-XR) 75 mg 24 hr capsule; Take 1 capsule (75 mg) by mouth once daily. Do not crush or chew.  -     buPROPion XL (Wellbutrin XL) 150 mg 24 hr tablet; Take 1 tablet (150 mg) by mouth once daily. Do not crush, chew, or split.  JUDITH (generalized anxiety disorder)  -     venlafaxine XR (Effexor-XR) 75 mg 24 hr capsule; Take 1 capsule (75 mg) by mouth once daily. Do not crush or chew.  PTSD (post-traumatic stress disorder)  -     venlafaxine XR (Effexor-XR) 75 mg 24 hr capsule; Take 1 capsule (75 mg) by mouth once daily. Do not crush or chew.  Insomnia, unspecified type  -      traZODone (Desyrel) 100 mg tablet; Take 1 tablet (100 mg) by mouth once daily at bedtime.      PHQ9  Over the past 2 weeks, how often have you been bothered by any of the following problems?  Little interest or pleasure in doing things: More than half the days  Feeling down, depressed, or hopeless: More than half the days  Trouble falling or staying asleep, or sleeping too much: Nearly every day  Feeling tired or having little energy: Nearly every day  Poor appetite or overeating: More than half the days  Feeling bad about yourself - or that you are a failure or have let yourself or your family down: Nearly every day  Trouble concentrating on things, such as reading the newspaper or watching television: More than half the days  Moving or speaking so slowly that other people could have noticed? Or the opposite - being so fidgety or restless that you have been moving around a lot more than usual.: Not at all  Thoughts that you would be better off dead or hurting yourself in some way: Several days  Patient Health Questionnaire-9 Score: 18        Next Appointment:  Follow up in 5 weeks (on 2/23/2024).

## 2024-02-12 NOTE — PROGRESS NOTES
Patient ID: Serenity Russell is a 58 y.o. female.  Referring Physician: No referring provider defined for this encounter.  Primary Care Provider: Danish Koch DO    Subjective    HPI    HPI: 55 year old G0 here today s/p debulking surgery for grade 1, stage IC endometrioid OVARIAN cancer.     Tumor history:  -Patient reports being diagnosed with left lower extremity DVT on 6/29/17. Patient reports DVT was confirmed with ultrasound. Patient currently being managed by Dr. Giles with daily Lovenox.    - (7/17/17):892.7    -Patient returned for admission 9/19-9/22 with likely lymphocele. Drain placed. No evidence of urinoma.   -CT (10/27/17): done for less output form the drain.  2 of three collections smaller.    -CA-125 (10/26/17): 11.7  -Hypersensitivity reaction with cycle #2.   -11/13/17  - 12.7   -CA-125 (11/13/17):12.7  -12/5/17 - IVC filter removed, right subclavian Mediport placed  -12/6/17 - transfused 2 units PRBCs   -1/4/18  11.3  -1/22/18  - 15  -1/29/18: last cycle (#6) of carbo/taxol  -Patient s/p incision & debridement of abdomen, mons pubis and right vulva on 2/10/18 for management of necrotizing fasciitis. Patient shared she is slowly regaining her strength. Patient reports VAC was applied on 3/23/18 with home care nurse recently  sharing that the wound has shown dramatic wound healing with VAC.  -Genetic testing negative.      Interval History: Serenity is a 58 year female with history of grade 1, stage IC endometrioid ovarian cancer. Patient with remote history of hysterectomy in 2011 for dysfunctional bleeding. She underwent exploratory laparotomy, bilateral salpingo oophorectomy, enterolysis, lysis of adhesions, bilateral pelvic lymph node dissection, omentectomy, peritoneal biopsies, omental biopsy, cystoscopy on 8/22/17. She completed 6 cycles of carbo/taxol. Treatment complicated by necrotizing fasciitis, s/p debridement of abdomen, vulva, and mons pubis on 2/10/18.  "CA-125 is pending. Patient underwent ex lap, MARIO, retrorectus incisional hernia repair with mesh on  with Dr. Garcia. Underwent endoscopy and HIDA scan recently, which were normal. No energy changes. No VB or discharge. She denies fevers, chills, chest pain, shortness of breath, nausea, vomiting.      PMH: morbid obesity, depression, DVT, ovarian cancer, necrotizing fascitis, DMII, hypothyroidism.      Past Surgical History: left ankle fracture with internal fixation (), pins removed (); hysterectomy ( for dysfunctional bleeding), Exploratory laparotomy, bilateral salpingo oophorectomy, enterolysis, lysis of adhesions, bilateral pelvic lymph  node dissection, omentectomy, peritoneal biopsies, omental biopsy, cystoscopy on 17. 2/10/18 Debridement of her abdomen, vulva, and mons.          Family History: mother - AML (85), breast (84); father - bladder (57), maternal grandmother with breast cancer and  at a young age.      Social History: Patient reports being a former cigarette smoker. Patient describes occasional alcohol use, denies recreational drug use.       OB/GYN History: Patient is G0. Patient is unaware of when entered menopause due to hysterectomy, HRT since . Patient denies history of abnormal pap smears.      Screening:    Mammogram: 2018: neg     Colonoscopy: 2000 - polyps per patient      Refer: Dmitri Sahni MD  PCP: Danish Koch MD     Objective    BSA: 1.98 meters squared  /76 (BP Location: Right arm, Patient Position: Sitting, BP Cuff Size: Large adult long)   Pulse 81   Temp 36 °C (96.8 °F) (Temporal)   Resp 17   Ht (S) 1.605 m (5' 3.19\")   Wt 88.3 kg (194 lb 8.9 oz)   SpO2 95%   BMI 34.26 kg/m²      Physical Exam  Vitals and nursing note reviewed.   Constitutional:       Appearance: Normal appearance. She is normal weight.   HENT:      Mouth/Throat:      Mouth: Mucous membranes are moist.      Pharynx: Oropharynx is clear.   Eyes:      " Conjunctiva/sclera: Conjunctivae normal.      Pupils: Pupils are equal, round, and reactive to light.   Cardiovascular:      Rate and Rhythm: Normal rate and regular rhythm.   Pulmonary:      Effort: Pulmonary effort is normal.      Breath sounds: Normal breath sounds.   Abdominal:      General: Abdomen is flat. There is no distension.      Palpations: Abdomen is soft. There is no mass.      Tenderness: There is no abdominal tenderness.   Genitourinary:     General: Normal vulva.      Vagina: Normal.      Uterus: Absent.       Rectum: Normal.      Comments: Normal external female genitalia beyond  the healed area on the right. Mons pubis with healed incision. Urethral meatus normal. Vagina normal.  Uterus and cervix surgically absent. No evidence of masses in pelvis or pain with palpation. No vaginal bleeding.  Musculoskeletal:         General: Normal range of motion.   Skin:     General: Skin is warm and dry.   Neurological:      Mental Status: She is alert.   Psychiatric:         Mood and Affect: Mood normal.         Behavior: Behavior normal.         Performance Status:  Asymptomatic    Assessment/Plan     Serenity is a 58 year female with history of grade 1, stage IC endometrioid ovarian cancer. Patient with remote history of hysterectomy in 2011 for dysfunctional bleeding.  She underwent exploratory laparotomy, bilateral salpingo oophorectomy, enterolysis, lysis of adhesions, bilateral pelvic lymph node dissection, omentectomy, peritoneal biopsies, omental biopsy, cystoscopy on 8/22/17. She completed 6 cycles of carbo/taxol.  Treatment complicated by necrotizing fasciitis, s/p debridement of abdomen, vulva, and mons pubis on 2/10/18. CA-125 will be drawn tomorrow.    Plan:    Physical examination was within normal limits today.  She is currently IJEOMA.  We reviewed signs and symptoms of possible recurrence with the patient and she will call our office should she experience any of these.     Will have  drawn  tomorrow, office will call with result     Continue Gabapentin 100mg at bedtime for neuropathy.     Follow up in 1 year or sooner if needed for cancer surveillance visit

## 2024-02-13 ENCOUNTER — OFFICE VISIT (OUTPATIENT)
Dept: GYNECOLOGIC ONCOLOGY | Facility: CLINIC | Age: 59
End: 2024-02-13
Payer: COMMERCIAL

## 2024-02-13 VITALS
RESPIRATION RATE: 17 BRPM | TEMPERATURE: 96.8 F | HEIGHT: 63 IN | DIASTOLIC BLOOD PRESSURE: 76 MMHG | HEART RATE: 81 BPM | WEIGHT: 194.56 LBS | OXYGEN SATURATION: 95 % | BODY MASS INDEX: 34.47 KG/M2 | SYSTOLIC BLOOD PRESSURE: 107 MMHG

## 2024-02-13 DIAGNOSIS — C56.9 MALIGNANT NEOPLASM OF OVARY, UNSPECIFIED LATERALITY (MULTI): Primary | ICD-10-CM

## 2024-02-13 PROCEDURE — 1036F TOBACCO NON-USER: CPT | Performed by: NURSE PRACTITIONER

## 2024-02-13 PROCEDURE — 3074F SYST BP LT 130 MM HG: CPT | Performed by: NURSE PRACTITIONER

## 2024-02-13 PROCEDURE — 99214 OFFICE O/P EST MOD 30 MIN: CPT | Performed by: NURSE PRACTITIONER

## 2024-02-13 PROCEDURE — 4010F ACE/ARB THERAPY RXD/TAKEN: CPT | Performed by: NURSE PRACTITIONER

## 2024-02-13 PROCEDURE — 3078F DIAST BP <80 MM HG: CPT | Performed by: NURSE PRACTITIONER

## 2024-02-13 ASSESSMENT — PAIN SCALES - GENERAL: PAINLEVEL: 0-NO PAIN

## 2024-02-23 ENCOUNTER — TELEMEDICINE (OUTPATIENT)
Dept: BEHAVIORAL HEALTH | Facility: CLINIC | Age: 59
End: 2024-02-23
Payer: COMMERCIAL

## 2024-02-23 DIAGNOSIS — F43.10 PTSD (POST-TRAUMATIC STRESS DISORDER): ICD-10-CM

## 2024-02-23 DIAGNOSIS — F41.1 GAD (GENERALIZED ANXIETY DISORDER): ICD-10-CM

## 2024-02-23 DIAGNOSIS — F33.1 MODERATE EPISODE OF RECURRENT MAJOR DEPRESSIVE DISORDER (MULTI): ICD-10-CM

## 2024-02-23 DIAGNOSIS — E11.9 TYPE 2 DIABETES MELLITUS WITHOUT COMPLICATION, WITHOUT LONG-TERM CURRENT USE OF INSULIN (MULTI): Primary | ICD-10-CM

## 2024-02-23 PROCEDURE — 4010F ACE/ARB THERAPY RXD/TAKEN: CPT | Performed by: PSYCHIATRY & NEUROLOGY

## 2024-02-23 PROCEDURE — 99214 OFFICE O/P EST MOD 30 MIN: CPT | Performed by: PSYCHIATRY & NEUROLOGY

## 2024-02-23 PROCEDURE — 1036F TOBACCO NON-USER: CPT | Performed by: PSYCHIATRY & NEUROLOGY

## 2024-02-23 RX ORDER — VENLAFAXINE HYDROCHLORIDE 150 MG/1
150 CAPSULE, EXTENDED RELEASE ORAL DAILY
Qty: 30 CAPSULE | Refills: 2 | Status: SHIPPED | OUTPATIENT
Start: 2024-02-23 | End: 2024-04-16 | Stop reason: SDUPTHER

## 2024-02-23 ASSESSMENT — ENCOUNTER SYMPTOMS
DYSPHORIC MOOD: 0
NERVOUS/ANXIOUS: 0

## 2024-02-23 NOTE — PROGRESS NOTES
"Adult Ambulatory Psychiatry Progress Note      Assessment/Plan     Impression:  Serenity Russell is a 58 y.o. female domiciled with fiance and his son, employed who presents for follow up with CC of Depression, Anxiety, PTSD (Post-Traumatic Stress Disorder), and Insomnia. Reviewed notes and labs of other providers.          Plan:   Anxiety/depression/ PTSD - increase to venlafaxine ER 150mg daily, wellbutrin XL 150mg daily, c/w therapy, c/w med ed, psycho ed and supportive psychotherapy, f/u 3 months  Insomnia - pt uses medical marijuana gummies, trazodone 100mg qhs prn       Subjective   HPI:  Pt arrived on time. She was able to switch from lexapro to effexor. Denies significant SE. She's had some insomnia. She's taking the effexor in the night. Suggested taking it in the morning. Mood is \"good\". Anxiety is high still. She started a new job this Monday. She feels she's handling it ok. She still experiences depressed mood \"here and there\". This morning she woke up \"in a foul mood for no reason\". It's lessening as the day goes by. Denies SI. The hot flashes are \"here and there\" but not like before. Appetite is ok. Discussed increasing effexor to 150mg.           Review of Systems   Psychiatric/Behavioral:  Negative for dysphoric mood and suicidal ideas. The patient is not nervous/anxious.            Objective   Mental Status Exam:  General Appearance: Well groomed, appropriate eye contact  Attitude/Behavior: Cooperative  Motor: No psychomotor agitation or retardation, no tremor or other abnormal movements  Speech: Normal rate, volume, prosody  Mood: \"good\"  Affect: Euthymic, full-range  Thought Process: Linear, goal directed  Thought Associations: No loosening of associations  Thought Content: Normal  Perception: No perceptual abnormalities noted  Insight: Intact  Judgement: Intact      Vitals:  There were no vitals filed for this visit.    Current Medications:  Current Outpatient Medications on File Prior to Visit "   Medication Sig Dispense Refill    buPROPion XL (Wellbutrin XL) 150 mg 24 hr tablet Take 1 tablet (150 mg) by mouth once daily. Do not crush, chew, or split. 30 tablet 2    cetirizine (ZyrTEC) 10 mg tablet Take 1 tablet (10 mg) by mouth once daily as needed.      empagliflozin (Jardiance) 25 mg Take 1 tablet (25 mg) by mouth once daily.      levothyroxine (Synthroid, Levoxyl) 100 mcg tablet Take 1 tablet (100 mcg) by mouth once daily in the morning. Take before meals.      lisinopril 2.5 mg tablet Take 1 tablet (2.5 mg) by mouth once daily.      LORazepam (Ativan) 0.5 mg tablet Take 1 tablet (0.5 mg) by mouth once daily as needed for anxiety.      nortriptyline (Pamelor) 10 mg capsule Take 1 capsule (10 mg) by mouth once daily at bedtime. 30 capsule 11    traZODone (Desyrel) 100 mg tablet Take 1 tablet (100 mg) by mouth once daily at bedtime. 30 tablet 2    Trulicity 1.5 mg/0.5 mL pen injector injection Inject 1.5 mg under the skin 1 (one) time per week.      [DISCONTINUED] venlafaxine XR (Effexor-XR) 75 mg 24 hr capsule Take 1 capsule (75 mg) by mouth once daily. Do not crush or chew. 30 capsule 1     No current facility-administered medications on file prior to visit.       Lab Review:   No visits with results within 2 Month(s) from this visit.   Latest known visit with results is:   Office Visit on 11/27/2023   Component Date Value    POC HEMOGLOBIN A1c 11/27/2023 6.3     Albumin, Urine Random 11/27/2023 16.0     Creatinine, Urine Random 11/27/2023 98.5     Albumin/Creatine Ratio 11/27/2023 16.2        Orders:  Diagnoses and all orders for this visit:  Moderate episode of recurrent major depressive disorder (CMS/HCC)  -     venlafaxine XR (Effexor-XR) 150 mg 24 hr capsule; Take 1 capsule (150 mg) by mouth once daily. Do not crush or chew.  JUDITH (generalized anxiety disorder)  -     venlafaxine XR (Effexor-XR) 150 mg 24 hr capsule; Take 1 capsule (150 mg) by mouth once daily. Do not crush or chew.  PTSD  (post-traumatic stress disorder)  -     venlafaxine XR (Effexor-XR) 150 mg 24 hr capsule; Take 1 capsule (150 mg) by mouth once daily. Do not crush or chew.        Next Appointment:  Follow up in 3 months (on 5/16/2024).

## 2024-02-26 ENCOUNTER — APPOINTMENT (OUTPATIENT)
Dept: PRIMARY CARE | Facility: CLINIC | Age: 59
End: 2024-02-26
Payer: COMMERCIAL

## 2024-02-26 RX ORDER — DULAGLUTIDE 3 MG/.5ML
3 INJECTION, SOLUTION SUBCUTANEOUS
Qty: 2 ML | Refills: 2 | Status: SHIPPED | OUTPATIENT
Start: 2024-02-26 | End: 2024-02-29 | Stop reason: SDUPTHER

## 2024-02-29 ENCOUNTER — LAB (OUTPATIENT)
Dept: LAB | Facility: LAB | Age: 59
End: 2024-02-29
Payer: COMMERCIAL

## 2024-02-29 ENCOUNTER — OFFICE VISIT (OUTPATIENT)
Dept: PRIMARY CARE | Facility: CLINIC | Age: 59
End: 2024-02-29
Payer: COMMERCIAL

## 2024-02-29 VITALS
WEIGHT: 194 LBS | RESPIRATION RATE: 19 BRPM | OXYGEN SATURATION: 99 % | TEMPERATURE: 97.8 F | HEIGHT: 63 IN | SYSTOLIC BLOOD PRESSURE: 130 MMHG | BODY MASS INDEX: 34.38 KG/M2 | HEART RATE: 70 BPM | DIASTOLIC BLOOD PRESSURE: 78 MMHG

## 2024-02-29 DIAGNOSIS — E11.9 TYPE 2 DIABETES MELLITUS WITHOUT COMPLICATION, WITHOUT LONG-TERM CURRENT USE OF INSULIN (MULTI): ICD-10-CM

## 2024-02-29 DIAGNOSIS — R00.2 PALPITATIONS: Primary | ICD-10-CM

## 2024-02-29 DIAGNOSIS — F41.0 PANIC ATTACKS: ICD-10-CM

## 2024-02-29 DIAGNOSIS — C56.9 MALIGNANT NEOPLASM OF OVARY, UNSPECIFIED LATERALITY (MULTI): ICD-10-CM

## 2024-02-29 LAB — POC HEMOGLOBIN A1C: 6.5 % (ref 4.2–6.5)

## 2024-02-29 PROCEDURE — 36415 COLL VENOUS BLD VENIPUNCTURE: CPT

## 2024-02-29 PROCEDURE — 1036F TOBACCO NON-USER: CPT | Performed by: FAMILY MEDICINE

## 2024-02-29 PROCEDURE — 3075F SYST BP GE 130 - 139MM HG: CPT | Performed by: FAMILY MEDICINE

## 2024-02-29 PROCEDURE — 99214 OFFICE O/P EST MOD 30 MIN: CPT | Performed by: FAMILY MEDICINE

## 2024-02-29 PROCEDURE — 86304 IMMUNOASSAY TUMOR CA 125: CPT

## 2024-02-29 PROCEDURE — 3078F DIAST BP <80 MM HG: CPT | Performed by: FAMILY MEDICINE

## 2024-02-29 PROCEDURE — 4010F ACE/ARB THERAPY RXD/TAKEN: CPT | Performed by: FAMILY MEDICINE

## 2024-02-29 PROCEDURE — 83036 HEMOGLOBIN GLYCOSYLATED A1C: CPT | Performed by: FAMILY MEDICINE

## 2024-02-29 RX ORDER — DULAGLUTIDE 3 MG/.5ML
3 INJECTION, SOLUTION SUBCUTANEOUS
Qty: 2 ML | Refills: 3 | Status: SHIPPED | OUTPATIENT
Start: 2024-02-29 | End: 2024-03-30

## 2024-02-29 ASSESSMENT — PAIN SCALES - GENERAL: PAINLEVEL: 0-NO PAIN

## 2024-02-29 NOTE — PROGRESS NOTES
"Subjective   Patient ID: Serenity Russell is a 58 y.o. female who presents for Diabetes (HERE FOR DIABETIC CHECK LAST EYE EXAM WAS IN NOVEMBER. TODAY A1C   6.5).    HPI she is getting some palpitations periodically and has some anxiety.      Review of Systems   Constitutional: Negative.    HENT: Negative.     Respiratory: Negative.     Cardiovascular:  Positive for palpitations.   Gastrointestinal: Negative.    Genitourinary: Negative.    Musculoskeletal: Negative.    Neurological: Negative.        Objective   /78 (BP Location: Right arm, Patient Position: Sitting, BP Cuff Size: Adult)   Pulse 70   Temp 36.6 °C (97.8 °F) (Skin)   Resp 19   Ht 1.6 m (5' 3\")   Wt 88 kg (194 lb)   SpO2 99%   BMI 34.37 kg/m²     Physical Exam  Constitutional:       General: She is not in acute distress.     Appearance: Normal appearance.   Cardiovascular:      Rate and Rhythm: Normal rate and regular rhythm.      Heart sounds: No murmur heard.  Pulmonary:      Breath sounds: Normal breath sounds. No wheezing.   Neurological:      Mental Status: She is alert.         Assessment/Plan   Problem List Items Addressed This Visit             ICD-10-CM    Type 2 diabetes mellitus (CMS/HCC) E11.9    Relevant Medications    dulaglutide (Trulicity) 3 mg/0.5 mL pen injector    Other Relevant Orders    POCT glycosylated hemoglobin (Hb A1C) manually resulted (Completed)     Other Visit Diagnoses         Codes    Palpitations    -  Primary R00.2    Relevant Orders    Referral to Cardiology    Panic attacks     F41.0        Continue current meds and recheck 3 months.  Work on good diet /exercise/wt loss       "

## 2024-03-01 LAB — CANCER AG125 SERPL-ACNC: 10.3 U/ML (ref 0–30.2)

## 2024-03-03 DIAGNOSIS — E05.90 HYPERTHYROIDISM: ICD-10-CM

## 2024-03-03 ASSESSMENT — ENCOUNTER SYMPTOMS
RESPIRATORY NEGATIVE: 1
PALPITATIONS: 1
GASTROINTESTINAL NEGATIVE: 1
CONSTITUTIONAL NEGATIVE: 1
NEUROLOGICAL NEGATIVE: 1
MUSCULOSKELETAL NEGATIVE: 1

## 2024-03-04 RX ORDER — LEVOTHYROXINE SODIUM 100 UG/1
TABLET ORAL
Qty: 90 TABLET | Refills: 3 | Status: SHIPPED | OUTPATIENT
Start: 2024-03-04

## 2024-03-07 ENCOUNTER — TELEPHONE (OUTPATIENT)
Dept: PRIMARY CARE | Facility: CLINIC | Age: 59
End: 2024-03-07
Payer: COMMERCIAL

## 2024-03-07 DIAGNOSIS — R11.0 NAUSEA: Primary | ICD-10-CM

## 2024-03-07 RX ORDER — ONDANSETRON 4 MG/1
4 TABLET, FILM COATED ORAL EVERY 8 HOURS PRN
Qty: 20 TABLET | Refills: 0 | Status: SHIPPED | OUTPATIENT
Start: 2024-03-07 | End: 2024-06-11

## 2024-03-08 ENCOUNTER — TELEPHONE (OUTPATIENT)
Dept: CARDIOLOGY | Facility: CLINIC | Age: 59
End: 2024-03-08
Payer: COMMERCIAL

## 2024-03-24 DIAGNOSIS — E11.9 TYPE 2 DIABETES MELLITUS WITHOUT COMPLICATION, WITHOUT LONG-TERM CURRENT USE OF INSULIN (MULTI): Primary | ICD-10-CM

## 2024-03-26 RX ORDER — LISINOPRIL 2.5 MG/1
2.5 TABLET ORAL DAILY
Qty: 90 TABLET | Refills: 0 | Status: SHIPPED | OUTPATIENT
Start: 2024-03-26

## 2024-03-26 RX ORDER — EMPAGLIFLOZIN 25 MG/1
25 TABLET, FILM COATED ORAL DAILY
Qty: 90 TABLET | Refills: 0 | Status: SHIPPED | OUTPATIENT
Start: 2024-03-26

## 2024-04-14 ENCOUNTER — PATIENT MESSAGE (OUTPATIENT)
Dept: BEHAVIORAL HEALTH | Facility: CLINIC | Age: 59
End: 2024-04-14
Payer: COMMERCIAL

## 2024-04-14 DIAGNOSIS — F41.1 GAD (GENERALIZED ANXIETY DISORDER): ICD-10-CM

## 2024-04-14 DIAGNOSIS — F43.10 PTSD (POST-TRAUMATIC STRESS DISORDER): ICD-10-CM

## 2024-04-14 DIAGNOSIS — F33.1 MODERATE EPISODE OF RECURRENT MAJOR DEPRESSIVE DISORDER (MULTI): ICD-10-CM

## 2024-04-16 RX ORDER — DULOXETIN HYDROCHLORIDE 30 MG/1
30 CAPSULE, DELAYED RELEASE ORAL DAILY
Qty: 30 CAPSULE | Refills: 1 | Status: SHIPPED | OUTPATIENT
Start: 2024-04-16 | End: 2024-05-01 | Stop reason: SDUPTHER

## 2024-04-16 RX ORDER — VENLAFAXINE HYDROCHLORIDE 75 MG/1
75 CAPSULE, EXTENDED RELEASE ORAL DAILY
Qty: 7 CAPSULE | Refills: 0 | Status: SHIPPED | OUTPATIENT
Start: 2024-04-16 | End: 2024-05-16 | Stop reason: ALTCHOICE

## 2024-05-01 ENCOUNTER — PATIENT MESSAGE (OUTPATIENT)
Dept: BEHAVIORAL HEALTH | Facility: CLINIC | Age: 59
End: 2024-05-01
Payer: COMMERCIAL

## 2024-05-01 DIAGNOSIS — F43.10 PTSD (POST-TRAUMATIC STRESS DISORDER): ICD-10-CM

## 2024-05-01 DIAGNOSIS — F33.1 MODERATE EPISODE OF RECURRENT MAJOR DEPRESSIVE DISORDER (MULTI): ICD-10-CM

## 2024-05-01 DIAGNOSIS — F41.1 GAD (GENERALIZED ANXIETY DISORDER): ICD-10-CM

## 2024-05-01 RX ORDER — DULOXETINE 40 MG/1
40 CAPSULE, DELAYED RELEASE ORAL DAILY
Qty: 30 CAPSULE | Refills: 0 | Status: SHIPPED | OUTPATIENT
Start: 2024-05-01 | End: 2024-05-16 | Stop reason: SDUPTHER

## 2024-05-14 ENCOUNTER — OFFICE VISIT (OUTPATIENT)
Dept: CARDIOLOGY | Facility: CLINIC | Age: 59
End: 2024-05-14
Payer: COMMERCIAL

## 2024-05-14 VITALS
DIASTOLIC BLOOD PRESSURE: 64 MMHG | HEART RATE: 90 BPM | HEIGHT: 63 IN | WEIGHT: 193 LBS | OXYGEN SATURATION: 97 % | SYSTOLIC BLOOD PRESSURE: 134 MMHG | BODY MASS INDEX: 34.2 KG/M2

## 2024-05-14 DIAGNOSIS — E11.9 TYPE 2 DIABETES MELLITUS WITHOUT COMPLICATION, WITHOUT LONG-TERM CURRENT USE OF INSULIN (MULTI): ICD-10-CM

## 2024-05-14 DIAGNOSIS — E78.2 MIXED HYPERLIPIDEMIA: ICD-10-CM

## 2024-05-14 DIAGNOSIS — R00.2 PALPITATIONS: ICD-10-CM

## 2024-05-14 DIAGNOSIS — R42 DIZZINESS: ICD-10-CM

## 2024-05-14 DIAGNOSIS — Z86.718 HISTORY OF DEEP VEIN THROMBOSIS: ICD-10-CM

## 2024-05-14 DIAGNOSIS — I10 ESSENTIAL HYPERTENSION: Primary | ICD-10-CM

## 2024-05-14 DIAGNOSIS — E03.9 HYPOTHYROIDISM, UNSPECIFIED TYPE: ICD-10-CM

## 2024-05-14 DIAGNOSIS — R00.0 HEART RATE FAST: ICD-10-CM

## 2024-05-14 PROCEDURE — 3075F SYST BP GE 130 - 139MM HG: CPT | Performed by: NURSE PRACTITIONER

## 2024-05-14 PROCEDURE — 4010F ACE/ARB THERAPY RXD/TAKEN: CPT | Performed by: NURSE PRACTITIONER

## 2024-05-14 PROCEDURE — 99204 OFFICE O/P NEW MOD 45 MIN: CPT | Performed by: NURSE PRACTITIONER

## 2024-05-14 PROCEDURE — 93010 ELECTROCARDIOGRAM REPORT: CPT | Performed by: INTERNAL MEDICINE

## 2024-05-14 PROCEDURE — 3078F DIAST BP <80 MM HG: CPT | Performed by: NURSE PRACTITIONER

## 2024-05-14 RX ORDER — OLOPATADINE HYDROCHLORIDE 1 MG/ML
SOLUTION OPHTHALMIC
COMMUNITY
Start: 2024-04-13

## 2024-05-14 RX ORDER — ESTRADIOL 0.1 MG/G
CREAM VAGINAL
COMMUNITY
Start: 2022-05-05 | End: 2024-05-14 | Stop reason: ALTCHOICE

## 2024-05-14 RX ORDER — AMPHETAMINE SULFATE 5 MG/1
TABLET ORAL EVERY 24 HOURS
COMMUNITY
End: 2024-05-14 | Stop reason: ALTCHOICE

## 2024-05-14 RX ORDER — ARIPIPRAZOLE 2 MG/1
TABLET ORAL
COMMUNITY
Start: 2020-10-06 | End: 2024-05-14 | Stop reason: ALTCHOICE

## 2024-05-14 RX ORDER — FAMOTIDINE 40 MG/1
TABLET, FILM COATED ORAL
COMMUNITY
Start: 2023-08-30 | End: 2024-05-14 | Stop reason: ALTCHOICE

## 2024-05-14 RX ORDER — ALOGLIPTIN AND METFORMIN HYDROCHLORIDE 12.5; 1 MG/1; MG/1
TABLET, FILM COATED ORAL
COMMUNITY
Start: 2018-10-31 | End: 2024-05-14 | Stop reason: ALTCHOICE

## 2024-05-14 RX ORDER — LORATADINE 10 MG/1
10 TABLET ORAL DAILY
COMMUNITY
Start: 2024-04-13

## 2024-05-14 RX ORDER — GABAPENTIN 300 MG/1
CAPSULE ORAL
COMMUNITY
Start: 2021-10-14 | End: 2024-05-14 | Stop reason: ALTCHOICE

## 2024-05-14 NOTE — PROGRESS NOTES
Primary Care Physician: Danish Koch DO  Primary Cardiologist:      Date of Visit: 2024  3:20 PM EDT  Location of visit:  [facility]   Type of Visit: New Patient      No chief complaint on file.      HPI / Summary:   Serenity Russell is a 59 y.o. female who presents to establish cardiac care   Past medical history significant for HTN, T2DM not requiring insulin,  venous thromboembolism, obesity, hypothyroid on replacement, anxiety / depression      Having episodes of palpitations at rest and on exertion random timing,  lasts 15 min, gradual onset and offset   Deep breathing helps some, ativan helps  Accompanied by Tingling, short of breath,  jittery       DVT was provoked the in the setting of ovarian cancer finished   Surgery / Chemo in 2018 Nec Fasc. Towards the end of her chemo on her lower abdomen       FH:  Father AF,   age 92       SOC: quit smoking 15 years, smokes MJ,  no ETOH     12 system review is negative except as noted above        Medical History:   Past Medical History:   Diagnosis Date    Cancer (Multi)     Diabetes mellitus (Multi)     Other disorders of vitreous body     Unspecified disorder of refraction     Unspecified macular degeneration        Surgical History:   Past Surgical History:   Procedure Laterality Date    CATARACT EXTRACTION W/  INTRAOCULAR LENS IMPLANT Bilateral     OD 2021 +19.5D, OS 2021 +19.0D    CT GUIDED PERCUTANEOUS BIOPSY MEDIASTINUM  10/03/2017    CT GUIDED PERCUTANEOUS BIOPSY MEDIASTINUM 10/3/2017 [facility]    US GUIDED ABSCESS DRAIN  2017    US GUIDED ABSCESS DRAIN 2017 [facility]    US GUIDED PERCUTANEOUS PERITONEAL OR RETROPERITONEAL FLUID COLLECTION DRAINAGE  2017    US GUIDED PERCUTANEOUS PERITONEAL OR RETROPERITONEAL FLUID COLLECTION DRAINAGE 2017 [facility]       Family History:   Family History   Problem Relation Name Age of Onset    Cancer Mother      Diabetes Mother      Cancer Father       No Known Problems Sister      No Known Problems Sister      No Known Problems Brother      No Known Problems Brother         Social History:   Tobacco Use: Medium Risk (12/27/2023)    Patient History     Smoking Tobacco Use: Former     Smokeless Tobacco Use: Never     Passive Exposure: Never             MEDICATIONS:   Current Outpatient Medications   Medication Instructions    alogliptin-metformin 12.5-1,000 mg tablet oral    amphetamine sulfate 5 mg tablet oral, Every 24 hours    ARIPiprazole (Abilify) 2 mg tablet oral, Daily RT    buPROPion XL (WELLBUTRIN XL) 150 mg, oral, Daily, Do not crush, chew, or split.    cetirizine (ZyrTEC) 10 mg tablet 1 tablet, oral, Daily PRN    DULoxetine 40 mg, oral, Daily, Do not crush or chew.    estradiol (Estrace) 0.01 % (0.1 mg/gram) vaginal cream vaginal    estrogens, conjugated, (Premarin) 1.25 mg tablet oral    famotidine (Pepcid) 40 mg tablet oral    gabapentin (Neurontin) 300 mg capsule oral    Jardiance 25 mg, oral, Daily    levothyroxine (Synthroid, Levoxyl) 100 mcg tablet TAKE ONE TABLET BY MOUTH IN THE MORNING ON AN EMPTY STOMACH    linagliptin-metformin 2.5-1,000 mg tablet, IR - ER, biphasic 24hr oral, Every 24 hours    lisinopril 2.5 mg, oral, Daily    loratadine (CLARITIN) 10 mg, oral, Daily    LORazepam (Ativan) 0.5 mg tablet 1 tablet, oral, Daily PRN    nortriptyline (PAMELOR) 10 mg, oral, Nightly    Pataday Twice Daily Relief 0.1 % ophthalmic solution INSTILL ONE DROP INTO EACH EYE TWICE DAILY AS NEEDED FOR ITCHY EYES    traZODone (DESYREL) 100 mg, oral, Nightly    Trulicity 3 mg, subcutaneous, Once Weekly    venlafaxine XR (EFFEXOR-XR) 75 mg, oral, Daily, Do not crush or chew.         IMAGING REVIEWED:     US venous doppler 6/29/17  IMPRESSION:         1. Acute and chronic deep venous thrombosis of the left  lower extremity as described above. 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 Chronic deep venous thrombosis of the popliteal vein is noted with  suspected acute deep venous thrombosis and posterior tibial and peroneal  veins..      LABS:  CBC:   Lab Results   Component Value Date    WBC 8.4 05/06/2023    RBC 5.29 (H) 05/06/2023    HGB 15.7 (H) 05/06/2023    HCT 46.6 (H) 05/06/2023    MCV 88.1 05/06/2023    MCH 29.7 05/06/2023    MCHC 33.7 05/06/2023    RDW 13.5 04/26/2023     05/06/2023    MPV 11.9 05/06/2023     CBC with Differential:    Lab Results   Component Value Date    WBC 8.4 05/06/2023    RBC 5.29 (H) 05/06/2023    HGB 15.7 (H) 05/06/2023    HCT 46.6 (H) 05/06/2023     05/06/2023    MCV 88.1 05/06/2023    MCH 29.7 05/06/2023    MCHC 33.7 05/06/2023    RDW 13.5 04/26/2023    NRBC 0 05/06/2023    BANDSPCT 29.0 (H) 02/14/2018    LYMPHOPCT 28.30 05/06/2023    MONOPCT 4.90 05/06/2023    MYELOPCT 1.0 (A) 02/14/2018    EOSPCT 1.3 04/26/2023    EOSPCT 0.0 02/14/2018    BASOPCT 0.50 05/06/2023    BASOPCT 0.0 02/14/2018    MONOSABS 0.41 05/06/2023    LYMPHSABS 2.36 05/06/2023    EOSABS 0.10 05/06/2023    EOSABS 0.00 02/14/2018    BASOSABS 0.04 05/06/2023    BASOSABS 0.00 02/14/2018     CMP:    Lab Results   Component Value Date     08/08/2023    K 4.4 08/08/2023     08/08/2023    CO2 24 08/08/2023    BUN 15 08/08/2023    CREATININE 1.00 08/08/2023    GLUCOSE 140 (H) 08/08/2023    PROT 7.6 05/06/2023    CALCIUM 9.9 08/08/2023    BILITOT 0.3 05/06/2023    ALKPHOS 78 05/06/2023    AST 28 05/06/2023    ALT 37 05/06/2023     BMP:    Lab Results   Component Value Date     08/08/2023    K 4.4 08/08/2023     08/08/2023    CO2 24 08/08/2023    BUN 15 08/08/2023    CREATININE 1.00 08/08/2023    CALCIUM 9.9 08/08/2023    GLUCOSE 140 (H) 08/08/2023     Magnesium:  Lab Results   Component Value Date    MG 1.70 04/26/2023     Troponin:  No results found for: \"TROPHS\"  BNP: No results found for: \"BNP\"    Lipid Panel:  Lab Results   Component Value Date "    HDL 48 (L) 05/06/2023    CHHDL 4.1 05/06/2023    VLDL 47 (H) 06/18/2018    TRIG 208 (H) 05/06/2023    NHDL 137 06/18/2018        Lab work and imaging results independently reviewed by me         Visit Vitals  Smoking Status Former          ECG dated 5/14/2024 independently reviewed   SR 93,  PVC        Constitutional:       Appearance: Healthy appearance. Not in distress.   Eyes:      Conjunctiva/sclera: Conjunctivae normal.   Neck:      Vascular: JVD normal.   Pulmonary:      Effort: Pulmonary effort is normal.      Breath sounds: Normal breath sounds.   Cardiovascular:      PMI at left midclavicular line. Normal rate. Regular rhythm. Normal S1. Normal S2.       Murmurs: There is no murmur.      No rub.   Pulses:     Intact distal pulses.   Edema:     Peripheral edema absent.   Abdominal:      General: Bowel sounds are normal.   Musculoskeletal:      Cervical back: Neck supple. Skin:     General: Skin is warm and dry.   Neurological:      Mental Status: Alert and oriented to person, place and time.               Problem List Items Addressed This Visit             ICD-10-CM    History of deep vein thrombosis Z86.718    Hypothyroidism E03.9    Mixed hyperlipidemia - Primary E78.2    Type 2 diabetes mellitus (Multi) E11.9    Essential hypertension I10       Extended Holter to correlate palpitations and r/o arrhythmia   Echocardiogram r/o structural disease       BP is in acceptable range on current RX which She is tolerating well and agrees to continue   --Lisinopril 2.5 mg      Follow up cardiology in 6 weeks; call sooner if problems arise   Continue routine follow up with your primary care provider         05/14/24 at 8:26 AM - MARTA Herrera        Followup Appts:  Future Appointments   Date Time Provider Department Center   5/14/2024  3:20 PM MARTA Herrera [id number] [facility]   5/16/2024 10:30 AM Jose Richards MD [id number] [facility]   11/18/2024  1:30 PM Presley Giron MD  [id number] [facility]   2/11/2025 11:00 AM Danielle Wang[facility]

## 2024-05-16 ENCOUNTER — TELEMEDICINE (OUTPATIENT)
Dept: BEHAVIORAL HEALTH | Facility: CLINIC | Age: 59
End: 2024-05-16
Payer: COMMERCIAL

## 2024-05-16 ENCOUNTER — HOSPITAL ENCOUNTER (OUTPATIENT)
Dept: CARDIOLOGY | Facility: HOSPITAL | Age: 59
Discharge: HOME | End: 2024-05-16
Payer: COMMERCIAL

## 2024-05-16 DIAGNOSIS — E03.9 HYPOTHYROIDISM, UNSPECIFIED TYPE: ICD-10-CM

## 2024-05-16 DIAGNOSIS — R00.2 PALPITATIONS: ICD-10-CM

## 2024-05-16 DIAGNOSIS — I10 ESSENTIAL HYPERTENSION: ICD-10-CM

## 2024-05-16 DIAGNOSIS — Z86.718 HISTORY OF DEEP VEIN THROMBOSIS: ICD-10-CM

## 2024-05-16 DIAGNOSIS — E11.9 TYPE 2 DIABETES MELLITUS WITHOUT COMPLICATION, WITHOUT LONG-TERM CURRENT USE OF INSULIN (MULTI): ICD-10-CM

## 2024-05-16 DIAGNOSIS — F41.1 GAD (GENERALIZED ANXIETY DISORDER): ICD-10-CM

## 2024-05-16 DIAGNOSIS — F33.1 MODERATE EPISODE OF RECURRENT MAJOR DEPRESSIVE DISORDER (MULTI): ICD-10-CM

## 2024-05-16 DIAGNOSIS — E78.2 MIXED HYPERLIPIDEMIA: ICD-10-CM

## 2024-05-16 DIAGNOSIS — G47.00 INSOMNIA, UNSPECIFIED TYPE: ICD-10-CM

## 2024-05-16 DIAGNOSIS — F43.10 PTSD (POST-TRAUMATIC STRESS DISORDER): ICD-10-CM

## 2024-05-16 PROCEDURE — 4010F ACE/ARB THERAPY RXD/TAKEN: CPT | Performed by: PSYCHIATRY & NEUROLOGY

## 2024-05-16 PROCEDURE — 1036F TOBACCO NON-USER: CPT | Performed by: PSYCHIATRY & NEUROLOGY

## 2024-05-16 PROCEDURE — 93005 ELECTROCARDIOGRAM TRACING: CPT

## 2024-05-16 PROCEDURE — 99214 OFFICE O/P EST MOD 30 MIN: CPT | Performed by: PSYCHIATRY & NEUROLOGY

## 2024-05-16 RX ORDER — BUPROPION HYDROCHLORIDE 150 MG/1
150 TABLET ORAL DAILY
Qty: 30 TABLET | Refills: 2 | Status: SHIPPED | OUTPATIENT
Start: 2024-05-16 | End: 2024-08-14

## 2024-05-16 RX ORDER — TRAZODONE HYDROCHLORIDE 100 MG/1
100 TABLET ORAL NIGHTLY
Qty: 30 TABLET | Refills: 2 | Status: SHIPPED | OUTPATIENT
Start: 2024-05-16 | End: 2024-08-14

## 2024-05-16 RX ORDER — DULOXETINE 40 MG/1
80 CAPSULE, DELAYED RELEASE ORAL DAILY
Qty: 60 CAPSULE | Refills: 1 | Status: SHIPPED | OUTPATIENT
Start: 2024-05-16 | End: 2024-07-15

## 2024-05-16 ASSESSMENT — ENCOUNTER SYMPTOMS
NERVOUS/ANXIOUS: 0
DYSPHORIC MOOD: 0

## 2024-05-16 ASSESSMENT — PATIENT HEALTH QUESTIONNAIRE - PHQ9
10. IF YOU CHECKED OFF ANY PROBLEMS, HOW DIFFICULT HAVE THESE PROBLEMS MADE IT FOR YOU TO DO YOUR WORK, TAKE CARE OF THINGS AT HOME, OR GET ALONG WITH OTHER PEOPLE: NOT DIFFICULT AT ALL
2. FEELING DOWN, DEPRESSED OR HOPELESS: SEVERAL DAYS
SUM OF ALL RESPONSES TO PHQ9 QUESTIONS 1 AND 2: 1
1. LITTLE INTEREST OR PLEASURE IN DOING THINGS: NOT AT ALL

## 2024-05-16 NOTE — PROGRESS NOTES
"Adult Ambulatory Psychiatry Progress Note      Assessment/Plan     Impression:  Serenity Russell is a 59 y.o. female domiciled with fiance and his son, employed who presents for follow up with CC of Anxiety, Depression, and PTSD (Post-Traumatic Stress Disorder). Reviewed notes and labs of other providers.          Plan:   Anxiety/depression/ PTSD - increase to duloxetine 80mg daily, wellbutrin XL 150mg daily, c/w therapy, c/w med ed, psycho ed and supportive psychotherapy, f/u 2 months  Insomnia - pt uses medical marijuana gummies, trazodone 100mg qhs prn       Subjective   HPI:  Pt arrived on time. Mood \"eh, ok\" since last session. She was able to change from effexor to duloxetine. Denies significant SE. She's seen some benefits from starting it and increasing the dose but she still experiences some symptoms. It's helping with anxiety but still has some irritability. Sleeping ok. Denies SI. Appetite ok. Taking medicine as prescribed. Denies significant SE. Discussed increasing duloxetine further.           Review of Systems   Psychiatric/Behavioral:  Negative for dysphoric mood and suicidal ideas. The patient is not nervous/anxious.            Objective   Mental Status Exam:  General Appearance: Well groomed, appropriate eye contact  Attitude/Behavior: Cooperative  Motor: No psychomotor agitation or retardation, no tremor or other abnormal movements  Speech: Normal rate, volume, prosody  Mood: \"eh, ok\"  Affect: Euthymic, full-range  Thought Process: Linear, goal directed  Thought Associations: No loosening of associations  Thought Content: Normal  Perception: No perceptual abnormalities noted  Insight: Intact  Judgement: Intact      Vitals:  There were no vitals filed for this visit.    Current Medications:  Current Outpatient Medications on File Prior to Visit   Medication Sig Dispense Refill    cetirizine (ZyrTEC) 10 mg tablet Take 1 tablet (10 mg) by mouth once daily as needed.      dulaglutide (Trulicity) 3 " mg/0.5 mL pen injector Inject 3 mg under the skin 1 (one) time per week. 2 mL 3    Jardiance 25 mg TAKE ONE TABLET BY MOUTH EVERY DAY 90 tablet 0    levothyroxine (Synthroid, Levoxyl) 100 mcg tablet TAKE ONE TABLET BY MOUTH IN THE MORNING ON AN EMPTY STOMACH 90 tablet 3    lisinopril 2.5 mg tablet TAKE ONE TABLET BY MOUTH EVERY DAY 90 tablet 0    loratadine (Claritin) 10 mg tablet Take 1 tablet (10 mg) by mouth once daily.      Pataday Twice Daily Relief 0.1 % ophthalmic solution INSTILL ONE DROP INTO EACH EYE TWICE DAILY AS NEEDED FOR ITCHY EYES      [DISCONTINUED] alogliptin-metformin 12.5-1,000 mg tablet Take by mouth.      [DISCONTINUED] amphetamine sulfate 5 mg tablet Take by mouth once every 24 hours.      [DISCONTINUED] ARIPiprazole (Abilify) 2 mg tablet Take by mouth once daily.      [DISCONTINUED] buPROPion XL (Wellbutrin XL) 150 mg 24 hr tablet Take 1 tablet (150 mg) by mouth once daily. Do not crush, chew, or split. 30 tablet 2    [DISCONTINUED] DULoxetine 40 mg DR capsule Take 1 capsule (40 mg) by mouth once daily. Do not crush or chew. 30 capsule 0    [DISCONTINUED] estradiol (Estrace) 0.01 % (0.1 mg/gram) vaginal cream Insert into the vagina.      [DISCONTINUED] estrogens, conjugated, (Premarin) 1.25 mg tablet Take by mouth.      [DISCONTINUED] famotidine (Pepcid) 40 mg tablet Take by mouth.      [DISCONTINUED] gabapentin (Neurontin) 300 mg capsule Take by mouth.      [DISCONTINUED] linagliptin-metformin 2.5-1,000 mg tablet, IR - ER, biphasic 24hr Take by mouth once every 24 hours.      [DISCONTINUED] LORazepam (Ativan) 0.5 mg tablet Take 1 tablet (0.5 mg) by mouth once daily as needed for anxiety.      [DISCONTINUED] nortriptyline (Pamelor) 10 mg capsule Take 1 capsule (10 mg) by mouth once daily at bedtime. (Patient not taking: Reported on 5/14/2024) 30 capsule 11    [DISCONTINUED] traZODone (Desyrel) 100 mg tablet Take 1 tablet (100 mg) by mouth once daily at bedtime. 30 tablet 2    [DISCONTINUED]  venlafaxine XR (Effexor-XR) 75 mg 24 hr capsule Take 1 capsule (75 mg) by mouth once daily for 7 days. Do not crush or chew. 7 capsule 0     No current facility-administered medications on file prior to visit.       Lab Review:   Hospital Outpatient Visit on 05/16/2024   Component Date Value    Ventricular Rate 05/16/2024 93     Atrial Rate 05/16/2024 93     AZ Interval 05/16/2024 144     QRS Duration 05/16/2024 78     QT Interval 05/16/2024 352     QTC Calculation(Bazett) 05/16/2024 437     P Axis 05/16/2024 48     R Axis 05/16/2024 68     T Pilot 05/16/2024 44     QRS Count 05/16/2024 14     Q Onset 05/16/2024 216     P Onset 05/16/2024 144     P Offset 05/16/2024 192     T Offset 05/16/2024 392     QTC Fredericia 05/16/2024 407        Orders:  Diagnoses and all orders for this visit:  Moderate episode of recurrent major depressive disorder (Multi)  -     DULoxetine 40 mg DR capsule; Take 2 capsules (80 mg) by mouth once daily. Do not crush or chew.  -     buPROPion XL (Wellbutrin XL) 150 mg 24 hr tablet; Take 1 tablet (150 mg) by mouth once daily. Do not crush, chew, or split.  JUDITH (generalized anxiety disorder)  -     DULoxetine 40 mg DR capsule; Take 2 capsules (80 mg) by mouth once daily. Do not crush or chew.  PTSD (post-traumatic stress disorder)  -     DULoxetine 40 mg DR capsule; Take 2 capsules (80 mg) by mouth once daily. Do not crush or chew.  Insomnia, unspecified type  -     traZODone (Desyrel) 100 mg tablet; Take 1 tablet (100 mg) by mouth once daily at bedtime.          Next Appointment:  Follow up in about 2 months (around 7/17/2024).

## 2024-05-24 LAB
ATRIAL RATE: 93 BPM
P AXIS: 48 DEGREES
P OFFSET: 192 MS
P ONSET: 144 MS
PR INTERVAL: 144 MS
Q ONSET: 216 MS
QRS COUNT: 14 BEATS
QRS DURATION: 78 MS
QT INTERVAL: 352 MS
QTC CALCULATION(BAZETT): 437 MS
QTC FREDERICIA: 407 MS
R AXIS: 68 DEGREES
T AXIS: 44 DEGREES
T OFFSET: 392 MS
VENTRICULAR RATE: 93 BPM

## 2024-05-28 ENCOUNTER — TELEPHONE (OUTPATIENT)
Dept: PRIMARY CARE | Facility: CLINIC | Age: 59
End: 2024-05-28
Payer: COMMERCIAL

## 2024-05-30 ENCOUNTER — OFFICE VISIT (OUTPATIENT)
Dept: PRIMARY CARE | Facility: CLINIC | Age: 59
End: 2024-05-30
Payer: COMMERCIAL

## 2024-05-30 VITALS
BODY MASS INDEX: 33.84 KG/M2 | HEART RATE: 105 BPM | OXYGEN SATURATION: 99 % | SYSTOLIC BLOOD PRESSURE: 128 MMHG | TEMPERATURE: 97.5 F | RESPIRATION RATE: 20 BRPM | WEIGHT: 191 LBS | DIASTOLIC BLOOD PRESSURE: 80 MMHG | HEIGHT: 63 IN

## 2024-05-30 DIAGNOSIS — R53.83 OTHER FATIGUE: Primary | ICD-10-CM

## 2024-05-30 DIAGNOSIS — J02.9 SORE THROAT: ICD-10-CM

## 2024-05-30 DIAGNOSIS — E11.9 TYPE 2 DIABETES MELLITUS WITHOUT COMPLICATION, WITHOUT LONG-TERM CURRENT USE OF INSULIN (MULTI): ICD-10-CM

## 2024-05-30 LAB
ALBUMIN SERPL-MCNC: 4.6 G/DL (ref 3.5–5)
ALP BLD-CCNC: 91 U/L (ref 35–125)
ALT SERPL-CCNC: 23 U/L (ref 5–40)
ANION GAP SERPL CALC-SCNC: 15 MMOL/L
AST SERPL-CCNC: 18 U/L (ref 5–40)
BASOPHILS # BLD AUTO: 0.06 X10*3/UL (ref 0–0.1)
BASOPHILS NFR BLD AUTO: 0.6 %
BILIRUB SERPL-MCNC: 0.3 MG/DL (ref 0.1–1.2)
BUN SERPL-MCNC: 17 MG/DL (ref 8–25)
CALCIUM SERPL-MCNC: 10 MG/DL (ref 8.5–10.4)
CHLORIDE SERPL-SCNC: 101 MMOL/L (ref 97–107)
CO2 SERPL-SCNC: 22 MMOL/L (ref 24–31)
CREAT SERPL-MCNC: 0.9 MG/DL (ref 0.4–1.6)
EGFRCR SERPLBLD CKD-EPI 2021: 75 ML/MIN/1.73M*2
EOSINOPHIL # BLD AUTO: 0.11 X10*3/UL (ref 0–0.7)
EOSINOPHIL NFR BLD AUTO: 1.2 %
ERYTHROCYTE [DISTWIDTH] IN BLOOD BY AUTOMATED COUNT: 13 % (ref 11.5–14.5)
GLUCOSE SERPL-MCNC: 96 MG/DL (ref 65–99)
HCT VFR BLD AUTO: 48.8 % (ref 36–46)
HGB BLD-MCNC: 16.4 G/DL (ref 12–16)
IMM GRANULOCYTES # BLD AUTO: 0.09 X10*3/UL (ref 0–0.7)
IMM GRANULOCYTES NFR BLD AUTO: 1 % (ref 0–0.9)
LYMPHOCYTES # BLD AUTO: 2.49 X10*3/UL (ref 1.2–4.8)
LYMPHOCYTES NFR BLD AUTO: 26.8 %
MCH RBC QN AUTO: 30.5 PG (ref 26–34)
MCHC RBC AUTO-ENTMCNC: 33.6 G/DL (ref 32–36)
MCV RBC AUTO: 91 FL (ref 80–100)
MONOCYTES # BLD AUTO: 0.47 X10*3/UL (ref 0.1–1)
MONOCYTES NFR BLD AUTO: 5.1 %
NEUTROPHILS # BLD AUTO: 6.07 X10*3/UL (ref 1.2–7.7)
NEUTROPHILS NFR BLD AUTO: 65.3 %
NRBC BLD-RTO: 0 /100 WBCS (ref 0–0)
PLATELET # BLD AUTO: 228 X10*3/UL (ref 150–450)
POC HEMOGLOBIN A1C: 6.4 % (ref 4.2–6.5)
POC RAPID STREP: NEGATIVE
POTASSIUM SERPL-SCNC: 4.7 MMOL/L (ref 3.4–5.1)
PROT SERPL-MCNC: 7.4 G/DL (ref 5.9–7.9)
RBC # BLD AUTO: 5.38 X10*6/UL (ref 4–5.2)
SODIUM SERPL-SCNC: 138 MMOL/L (ref 133–145)
TSH SERPL DL<=0.05 MIU/L-ACNC: 0.52 MIU/L (ref 0.27–4.2)
WBC # BLD AUTO: 9.3 X10*3/UL (ref 4.4–11.3)

## 2024-05-30 PROCEDURE — 84443 ASSAY THYROID STIM HORMONE: CPT | Performed by: FAMILY MEDICINE

## 2024-05-30 PROCEDURE — 36415 COLL VENOUS BLD VENIPUNCTURE: CPT | Performed by: FAMILY MEDICINE

## 2024-05-30 PROCEDURE — 3079F DIAST BP 80-89 MM HG: CPT | Performed by: FAMILY MEDICINE

## 2024-05-30 PROCEDURE — 4010F ACE/ARB THERAPY RXD/TAKEN: CPT | Performed by: FAMILY MEDICINE

## 2024-05-30 PROCEDURE — 87880 STREP A ASSAY W/OPTIC: CPT | Mod: QW | Performed by: FAMILY MEDICINE

## 2024-05-30 PROCEDURE — 80053 COMPREHEN METABOLIC PANEL: CPT | Performed by: FAMILY MEDICINE

## 2024-05-30 PROCEDURE — 87081 CULTURE SCREEN ONLY: CPT | Mod: WESLAB | Performed by: FAMILY MEDICINE

## 2024-05-30 PROCEDURE — 85025 COMPLETE CBC W/AUTO DIFF WBC: CPT | Performed by: FAMILY MEDICINE

## 2024-05-30 PROCEDURE — 99214 OFFICE O/P EST MOD 30 MIN: CPT | Performed by: FAMILY MEDICINE

## 2024-05-30 PROCEDURE — 3074F SYST BP LT 130 MM HG: CPT | Performed by: FAMILY MEDICINE

## 2024-05-30 PROCEDURE — 83036 HEMOGLOBIN GLYCOSYLATED A1C: CPT | Mod: QW | Performed by: FAMILY MEDICINE

## 2024-05-30 ASSESSMENT — COLUMBIA-SUICIDE SEVERITY RATING SCALE - C-SSRS
1. IN THE PAST MONTH, HAVE YOU WISHED YOU WERE DEAD OR WISHED YOU COULD GO TO SLEEP AND NOT WAKE UP?: NO
2. HAVE YOU ACTUALLY HAD ANY THOUGHTS OF KILLING YOURSELF?: NO
6. HAVE YOU EVER DONE ANYTHING, STARTED TO DO ANYTHING, OR PREPARED TO DO ANYTHING TO END YOUR LIFE?: NO

## 2024-05-30 ASSESSMENT — ENCOUNTER SYMPTOMS
COUGH: 1
LOSS OF SENSATION IN FEET: 0
FATIGUE: 1
NECK PAIN: 1
DEPRESSION: 0
SORE THROAT: 1
HEADACHES: 1
OCCASIONAL FEELINGS OF UNSTEADINESS: 0
RHINORRHEA: 1

## 2024-05-30 ASSESSMENT — PATIENT HEALTH QUESTIONNAIRE - PHQ9
2. FEELING DOWN, DEPRESSED OR HOPELESS: NOT AT ALL
1. LITTLE INTEREST OR PLEASURE IN DOING THINGS: NOT AT ALL
SUM OF ALL RESPONSES TO PHQ9 QUESTIONS 1 AND 2: 0

## 2024-05-30 ASSESSMENT — PAIN SCALES - GENERAL: PAINLEVEL: 4

## 2024-05-30 NOTE — PROGRESS NOTES
"Subjective   Patient ID: Serenity Russell is a 59 y.o. female who presents for Fatigue (Pt is here with complaints of stomach pain and fatigue. Pt reports a new onset of ear and throat pain.).    Earache   There is pain in the left ear. This is a new problem. The current episode started yesterday. The problem occurs constantly. The problem has been unchanged. The pain is at a severity of 5/10. Associated symptoms include coughing, headaches, neck pain, rhinorrhea and a sore throat.    weakness and fatigue for a few days.  No chest pain or fever.       Review of Systems   Constitutional:  Positive for fatigue.   HENT:  Positive for ear pain, rhinorrhea and sore throat.    Respiratory:  Positive for cough.    Musculoskeletal:  Positive for neck pain.   Neurological:  Positive for headaches.       Objective   /80 (BP Location: Left arm, Patient Position: Sitting, BP Cuff Size: Adult)   Pulse 105   Temp 36.4 °C (97.5 °F) (Temporal)   Resp 20   Ht 1.6 m (5' 3\")   Wt 86.6 kg (191 lb)   SpO2 99%   BMI 33.83 kg/m²     Physical Exam  Constitutional:       General: She is not in acute distress.     Appearance: Normal appearance.   HENT:      Mouth/Throat:      Pharynx: Posterior oropharyngeal erythema present. No oropharyngeal exudate.   Eyes:      General:         Right eye: No discharge.         Left eye: No discharge.      Conjunctiva/sclera: Conjunctivae normal.      Pupils: Pupils are equal, round, and reactive to light.   Cardiovascular:      Rate and Rhythm: Normal rate and regular rhythm.      Heart sounds: No murmur heard.  Pulmonary:      Breath sounds: Normal breath sounds. No wheezing.   Lymphadenopathy:      Cervical: No cervical adenopathy.   Neurological:      Mental Status: She is alert.         Assessment/Plan   Problem List Items Addressed This Visit             ICD-10-CM    Type 2 diabetes mellitus (Multi) E11.9    Relevant Orders    POCT glycosylated hemoglobin (Hb A1C) manually resulted " (Completed)     Other Visit Diagnoses         Codes    Other fatigue    -  Primary R53.83    Relevant Orders    TSH with reflex to Free T4 if abnormal (Completed)    Comprehensive Metabolic Panel (Completed)    CBC and Auto Differential (Completed)    Sore throat     J02.9    Relevant Orders    POCT rapid strep A manually resulted (Completed)    Group A Streptococcus, Culture (Completed)

## 2024-05-30 NOTE — LETTER
May 30, 2024     Patient: Serenity Russell   YOB: 1965   Date of Visit: 5/30/2024       To Whom It May Concern:    Serenity Russell was seen in my clinic on 5/30/2024 at 2:00 pm. Please excuse Serenity for her absence from work on this day to make the appointment. She may return to work on 6/3/24.    If you have any questions or concerns, please don't hesitate to call.         Sincerely,         Danish Koch, DO        CC: No Recipients

## 2024-05-31 ENCOUNTER — HOSPITAL ENCOUNTER (OUTPATIENT)
Dept: CARDIOLOGY | Facility: HOSPITAL | Age: 59
Discharge: HOME | End: 2024-05-31
Payer: COMMERCIAL

## 2024-05-31 DIAGNOSIS — R94.31 ABNORMAL ELECTROCARDIOGRAM (ECG) (EKG): ICD-10-CM

## 2024-05-31 DIAGNOSIS — R00.2 PALPITATIONS: ICD-10-CM

## 2024-05-31 DIAGNOSIS — R42 DIZZINESS: ICD-10-CM

## 2024-05-31 DIAGNOSIS — D75.1 POLYCYTHEMIA: Primary | ICD-10-CM

## 2024-05-31 DIAGNOSIS — R71.8 ELEVATED RED BLOOD CELL COUNT: ICD-10-CM

## 2024-05-31 DIAGNOSIS — R00.0 HEART RATE FAST: ICD-10-CM

## 2024-05-31 LAB
AORTIC VALVE PEAK VELOCITY: 1.37 M/S
AV PEAK GRADIENT: 7.5 MMHG
AVA (PEAK VEL): 2.82 CM2
EJECTION FRACTION APICAL 4 CHAMBER: 60.6
LEFT ATRIUM VOLUME AREA LENGTH INDEX BSA: 13.3 ML/M2
LEFT VENTRICLE INTERNAL DIMENSION DIASTOLE: 3.48 CM (ref 3.5–6)
LEFT VENTRICULAR OUTFLOW TRACT DIAMETER: 2 CM
LV EJECTION FRACTION BIPLANE: 66 %
MITRAL VALVE E/A RATIO: 0.83
MITRAL VALVE E/E' RATIO: 7.09
RIGHT VENTRICLE FREE WALL PEAK S': 10.9 CM/S
TRICUSPID ANNULAR PLANE SYSTOLIC EXCURSION: 1.8 CM

## 2024-05-31 PROCEDURE — 93306 TTE W/DOPPLER COMPLETE: CPT

## 2024-05-31 PROCEDURE — 93246 EXT ECG>7D<15D RECORDING: CPT

## 2024-06-01 LAB — S PYO THROAT QL CULT: NORMAL

## 2024-06-09 DIAGNOSIS — R11.0 NAUSEA: Primary | ICD-10-CM

## 2024-06-10 ENCOUNTER — APPOINTMENT (OUTPATIENT)
Dept: PRIMARY CARE | Facility: CLINIC | Age: 59
End: 2024-06-10
Payer: COMMERCIAL

## 2024-06-11 RX ORDER — ONDANSETRON 4 MG/1
TABLET, FILM COATED ORAL
Qty: 20 TABLET | Refills: 0 | Status: SHIPPED | OUTPATIENT
Start: 2024-06-11

## 2024-06-22 DIAGNOSIS — E11.9 TYPE 2 DIABETES MELLITUS WITHOUT COMPLICATION, WITHOUT LONG-TERM CURRENT USE OF INSULIN (MULTI): Primary | ICD-10-CM

## 2024-06-23 DIAGNOSIS — E11.9 TYPE 2 DIABETES MELLITUS WITHOUT COMPLICATION, WITHOUT LONG-TERM CURRENT USE OF INSULIN (MULTI): Primary | ICD-10-CM

## 2024-06-24 RX ORDER — DULAGLUTIDE 3 MG/.5ML
INJECTION, SOLUTION SUBCUTANEOUS
Qty: 2 ML | Refills: 2 | Status: SHIPPED | OUTPATIENT
Start: 2024-06-24

## 2024-06-24 RX ORDER — LISINOPRIL 2.5 MG/1
2.5 TABLET ORAL DAILY
Qty: 90 TABLET | Refills: 1 | Status: SHIPPED | OUTPATIENT
Start: 2024-06-24

## 2024-06-24 RX ORDER — EMPAGLIFLOZIN 25 MG/1
25 TABLET, FILM COATED ORAL DAILY
Qty: 90 TABLET | Refills: 1 | Status: SHIPPED | OUTPATIENT
Start: 2024-06-24

## 2024-07-01 ENCOUNTER — TELEPHONE (OUTPATIENT)
Dept: PRIMARY CARE | Facility: CLINIC | Age: 59
End: 2024-07-01

## 2024-07-01 ENCOUNTER — APPOINTMENT (OUTPATIENT)
Dept: CARDIOLOGY | Facility: CLINIC | Age: 59
End: 2024-07-01
Payer: COMMERCIAL

## 2024-07-01 DIAGNOSIS — E11.9 TYPE 2 DIABETES MELLITUS WITHOUT COMPLICATION, WITHOUT LONG-TERM CURRENT USE OF INSULIN (MULTI): ICD-10-CM

## 2024-07-01 RX ORDER — DULAGLUTIDE 3 MG/.5ML
INJECTION, SOLUTION SUBCUTANEOUS
Qty: 2 ML | Refills: 2 | Status: SHIPPED | OUTPATIENT
Start: 2024-07-01

## 2024-07-01 NOTE — TELEPHONE ENCOUNTER
Spoke with patient, [facility] pharmacy to get stock in of 3mg. If unable to fill still will look into obtaining different Glp1

## 2024-07-02 ENCOUNTER — PHARMACY VISIT (OUTPATIENT)
Dept: PHARMACY | Facility: CLINIC | Age: 59
End: 2024-07-02
Payer: COMMERCIAL

## 2024-07-02 PROCEDURE — RXMED WILLOW AMBULATORY MEDICATION CHARGE

## 2024-07-11 ENCOUNTER — APPOINTMENT (OUTPATIENT)
Dept: BEHAVIORAL HEALTH | Facility: CLINIC | Age: 59
End: 2024-07-11
Payer: COMMERCIAL

## 2024-07-11 DIAGNOSIS — F41.1 GAD (GENERALIZED ANXIETY DISORDER): ICD-10-CM

## 2024-07-11 DIAGNOSIS — F43.10 PTSD (POST-TRAUMATIC STRESS DISORDER): ICD-10-CM

## 2024-07-11 DIAGNOSIS — F33.1 MODERATE EPISODE OF RECURRENT MAJOR DEPRESSIVE DISORDER (MULTI): ICD-10-CM

## 2024-07-11 PROCEDURE — 1036F TOBACCO NON-USER: CPT | Performed by: PSYCHIATRY & NEUROLOGY

## 2024-07-11 PROCEDURE — 4010F ACE/ARB THERAPY RXD/TAKEN: CPT | Performed by: PSYCHIATRY & NEUROLOGY

## 2024-07-11 PROCEDURE — 99214 OFFICE O/P EST MOD 30 MIN: CPT | Performed by: PSYCHIATRY & NEUROLOGY

## 2024-07-11 RX ORDER — DULOXETIN HYDROCHLORIDE 30 MG/1
CAPSULE, DELAYED RELEASE ORAL
Qty: 21 CAPSULE | Refills: 0 | Status: SHIPPED | OUTPATIENT
Start: 2024-07-11

## 2024-07-11 RX ORDER — PAROXETINE HYDROCHLORIDE 20 MG/1
TABLET, FILM COATED ORAL
Qty: 30 TABLET | Refills: 1 | Status: SHIPPED | OUTPATIENT
Start: 2024-07-11

## 2024-07-11 ASSESSMENT — ENCOUNTER SYMPTOMS
DYSPHORIC MOOD: 0
NERVOUS/ANXIOUS: 0

## 2024-07-11 ASSESSMENT — PATIENT HEALTH QUESTIONNAIRE - PHQ9
10. IF YOU CHECKED OFF ANY PROBLEMS, HOW DIFFICULT HAVE THESE PROBLEMS MADE IT FOR YOU TO DO YOUR WORK, TAKE CARE OF THINGS AT HOME, OR GET ALONG WITH OTHER PEOPLE: SOMEWHAT DIFFICULT
1. LITTLE INTEREST OR PLEASURE IN DOING THINGS: SEVERAL DAYS
2. FEELING DOWN, DEPRESSED OR HOPELESS: SEVERAL DAYS
SUM OF ALL RESPONSES TO PHQ9 QUESTIONS 1 AND 2: 2

## 2024-07-11 NOTE — PROGRESS NOTES
"Adult Ambulatory Psychiatry Progress Note      Assessment/Plan     Impression:  Serenity Russell is a 59 y.o. female domiciled with fiance and his son, employed who presents for follow up with CC of Depression, Anxiety, PTSD (Post-Traumatic Stress Disorder), and Insomnia.          Plan:   Anxiety/depression/ PTSD - taper and stop duloxetine, start paroxetine 10mg daily for 7 days, then 20mg daily, wellbutrin XL 150mg daily, c/w therapy, c/w med ed, psycho ed and supportive psychotherapy, f/u 6 weeks  Insomnia - pt uses medical marijuana gummies, trazodone 100mg qhs prn       Subjective   HPI:  Pt arrived on time. Mood \"crabby\" since last session. She was able to increase the duloxetine. She's feeling the duloxetine hasn't been very helpful. Anxiety has been \"not great\". She feels more anxious than she used to. Suggested the paxil may have been more helpful for her. Sleep is fair. Lately it's been worse. She wakes up a lot. She falls asleep again but it takes up to an hour. Appetite ok. Taking medicine as prescribed. Denies significant SE. Discussed going back to the paxil.           Review of Systems   Psychiatric/Behavioral:  Negative for dysphoric mood and suicidal ideas. The patient is not nervous/anxious.            Objective   Mental Status Exam:  General Appearance: Well groomed, appropriate eye contact  Attitude/Behavior: Cooperative  Motor: No psychomotor agitation or retardation, no tremor or other abnormal movements  Speech: Normal rate, volume, prosody  Mood: \"crabby\"  Affect: Constricted  Thought Process: Linear, goal directed  Thought Associations: No loosening of associations  Thought Content: Normal  Perception: No perceptual abnormalities noted  Insight: Intact  Judgement: Intact      Vitals:  There were no vitals filed for this visit.    Current Medications:  Current Outpatient Medications on File Prior to Visit   Medication Sig Dispense Refill    buPROPion XL (Wellbutrin XL) 150 mg 24 hr tablet " Take 1 tablet (150 mg) by mouth once daily. Do not crush, chew, or split. 30 tablet 2    cetirizine (ZyrTEC) 10 mg tablet Take 1 tablet (10 mg) by mouth once daily as needed.      dulaglutide (Trulicity) 3 mg/0.5 mL pen injector INJECT 3 MG UNDER THE SKIN 1 TIME PER WEEK 2 mL 2    EPINEPHrine 0.3 mg/0.3 mL injection syringe Inject 0.3 mL (0.3 mg) into the muscle 1 time if needed.      fluticasone (Flonase) 50 mcg/actuation nasal spray Administer 1 spray into each nostril once daily.      Jardiance 25 mg TAKE 1 TABLET BY MOUTH ONCE A DAY 90 tablet 1    levothyroxine (Synthroid, Levoxyl) 100 mcg tablet TAKE ONE TABLET BY MOUTH IN THE MORNING ON AN EMPTY STOMACH 90 tablet 3    lisinopril 2.5 mg tablet TAKE ONE TABLET BY MOUTH ONCE A DAY 90 tablet 1    loratadine (Claritin) 10 mg tablet Take 1 tablet (10 mg) by mouth once daily.      nortriptyline (Pamelor) 10 mg capsule Take 1 capsule (10 mg) by mouth once daily at bedtime.      ondansetron (Zofran) 4 mg tablet TAKE ONE (1) TABLET BY MOUTH EVERY 8 HOURS IF NEEDED FOR NAUSEA OR VOMITING FOR UP TO 7 DAYS 20 tablet 0    Pataday Twice Daily Relief 0.1 % ophthalmic solution INSTILL ONE DROP INTO EACH EYE TWICE DAILY AS NEEDED FOR ITCHY EYES      traZODone (Desyrel) 100 mg tablet Take 1 tablet (100 mg) by mouth once daily at bedtime. 30 tablet 2    [DISCONTINUED] DULoxetine 40 mg DR capsule Take 2 capsules (80 mg) by mouth once daily. Do not crush or chew. 60 capsule 1     No current facility-administered medications on file prior to visit.       Lab Review:   Hospital Outpatient Visit on 05/31/2024   Component Date Value    AV pk miriam 05/31/2024 1.37     LVOT diam 05/31/2024 2.00     LV Biplane EF 05/31/2024 66     MV avg E/e' ratio 05/31/2024 7.09     MV E/A ratio 05/31/2024 0.83     LA vol index A/L 05/31/2024 13.3     Tricuspid annular plane * 05/31/2024 1.8     RV free wall pk S' 05/31/2024 10.90     LVIDd 05/31/2024 3.48     Aortic Valve Area by Con* 05/31/2024 2.82      AV pk grad 05/31/2024 7.5     LV A4C EF 05/31/2024 60.6    Office Visit on 05/30/2024   Component Date Value    POC HEMOGLOBIN A1c 05/30/2024 6.4     POC Rapid Strep 05/30/2024 Negative     Group A Strep Screen, Cu* 05/30/2024 No Group A Streptococcus (GAS) isolated     Thyroid Stimulating Horm* 05/30/2024 0.52     Glucose 05/30/2024 96     Sodium 05/30/2024 138     Potassium 05/30/2024 4.7     Chloride 05/30/2024 101     Bicarbonate 05/30/2024 22 (L)     Urea Nitrogen 05/30/2024 17     Creatinine 05/30/2024 0.90     eGFR 05/30/2024 75     Calcium 05/30/2024 10.0     Albumin 05/30/2024 4.6     Alkaline Phosphatase 05/30/2024 91     Total Protein 05/30/2024 7.4     AST 05/30/2024 18     Bilirubin, Total 05/30/2024 0.3     ALT 05/30/2024 23     Anion Gap 05/30/2024 15     WBC 05/30/2024 9.3     nRBC 05/30/2024 0.0     RBC 05/30/2024 5.38 (H)     Hemoglobin 05/30/2024 16.4 (H)     Hematocrit 05/30/2024 48.8 (H)     MCV 05/30/2024 91     MCH 05/30/2024 30.5     MCHC 05/30/2024 33.6     RDW 05/30/2024 13.0     Platelets 05/30/2024 228     Neutrophils % 05/30/2024 65.3     Immature Granulocytes %,* 05/30/2024 1.0 (H)     Lymphocytes % 05/30/2024 26.8     Monocytes % 05/30/2024 5.1     Eosinophils % 05/30/2024 1.2     Basophils % 05/30/2024 0.6     Neutrophils Absolute 05/30/2024 6.07     Immature Granulocytes Ab* 05/30/2024 0.09     Lymphocytes Absolute 05/30/2024 2.49     Monocytes Absolute 05/30/2024 0.47     Eosinophils Absolute 05/30/2024 0.11     Basophils Absolute 05/30/2024 0.06    Hospital Outpatient Visit on 05/16/2024   Component Date Value    Ventricular Rate 05/16/2024 93     Atrial Rate 05/16/2024 93     MD Interval 05/16/2024 144     QRS Duration 05/16/2024 78     QT Interval 05/16/2024 352     QTC Calculation(Bazett) 05/16/2024 437     P Axis 05/16/2024 48     R Axis 05/16/2024 68     T [place] 05/16/2024 44     QRS Count 05/16/2024 14     Q Onset 05/16/2024 216     P Onset 05/16/2024 144     P Offset  05/16/2024 192     T Offset 05/16/2024 392     QTC [place] 05/16/2024 407        Orders:  Diagnoses and all orders for this visit:  Moderate episode of recurrent major depressive disorder (Multi)  -     DULoxetine (Cymbalta) 30 mg DR capsule; TAKE 2 CAPSULES BY MOUTH DAILY FOR 7 DAYS, THEN 1 CAPSULE DAILY UNTIL GONE. Do not crush or chew.  -     PARoxetine (Paxil) 20 mg tablet; ONCE DULOXETINE IS GONE, TAKE 1/2 TABLET DAILY FOR 7 DAYS, THEN TAKE 1 TABLET DAILY  -     Follow Up In Psychiatry; Future  JUDITH (generalized anxiety disorder)  -     DULoxetine (Cymbalta) 30 mg DR capsule; TAKE 2 CAPSULES BY MOUTH DAILY FOR 7 DAYS, THEN 1 CAPSULE DAILY UNTIL GONE. Do not crush or chew.  -     PARoxetine (Paxil) 20 mg tablet; ONCE DULOXETINE IS GONE, TAKE 1/2 TABLET DAILY FOR 7 DAYS, THEN TAKE 1 TABLET DAILY  PTSD (post-traumatic stress disorder)  -     DULoxetine (Cymbalta) 30 mg DR capsule; TAKE 2 CAPSULES BY MOUTH DAILY FOR 7 DAYS, THEN 1 CAPSULE DAILY UNTIL GONE. Do not crush or chew.  -     PARoxetine (Paxil) 20 mg tablet; ONCE DULOXETINE IS GONE, TAKE 1/2 TABLET DAILY FOR 7 DAYS, THEN TAKE 1 TABLET DAILY        PHQ9  Over the past 2 weeks, how often have you been bothered by any of the following problems?  Little interest or pleasure in doing things: Several days  Feeling down, depressed, or hopeless: Several days      Next Appointment:  Follow up in 7 weeks (on 8/29/2024).

## 2024-07-15 ENCOUNTER — OFFICE VISIT (OUTPATIENT)
Dept: PRIMARY CARE | Facility: CLINIC | Age: 59
End: 2024-07-15
Payer: COMMERCIAL

## 2024-07-15 VITALS
BODY MASS INDEX: 34.05 KG/M2 | SYSTOLIC BLOOD PRESSURE: 126 MMHG | DIASTOLIC BLOOD PRESSURE: 76 MMHG | HEIGHT: 63 IN | HEART RATE: 93 BPM | TEMPERATURE: 97.5 F | WEIGHT: 192.2 LBS | OXYGEN SATURATION: 99 %

## 2024-07-15 DIAGNOSIS — J01.00 ACUTE NON-RECURRENT MAXILLARY SINUSITIS: Primary | ICD-10-CM

## 2024-07-15 DIAGNOSIS — R05.1 ACUTE COUGH: ICD-10-CM

## 2024-07-15 PROCEDURE — 99213 OFFICE O/P EST LOW 20 MIN: CPT | Performed by: REGISTERED NURSE

## 2024-07-15 PROCEDURE — 1036F TOBACCO NON-USER: CPT | Performed by: REGISTERED NURSE

## 2024-07-15 PROCEDURE — 3074F SYST BP LT 130 MM HG: CPT | Performed by: REGISTERED NURSE

## 2024-07-15 PROCEDURE — 3078F DIAST BP <80 MM HG: CPT | Performed by: REGISTERED NURSE

## 2024-07-15 PROCEDURE — 4010F ACE/ARB THERAPY RXD/TAKEN: CPT | Performed by: REGISTERED NURSE

## 2024-07-15 PROCEDURE — 87635 SARS-COV-2 COVID-19 AMP PRB: CPT | Mod: WESLAB | Performed by: REGISTERED NURSE

## 2024-07-15 RX ORDER — AZITHROMYCIN 250 MG/1
TABLET, FILM COATED ORAL
Qty: 6 TABLET | Refills: 0 | Status: SHIPPED | OUTPATIENT
Start: 2024-07-15 | End: 2024-07-20

## 2024-07-15 RX ORDER — PREDNISONE 20 MG/1
20 TABLET ORAL DAILY
Qty: 5 TABLET | Refills: 0 | Status: SHIPPED | OUTPATIENT
Start: 2024-07-15 | End: 2024-07-20

## 2024-07-15 RX ORDER — BENZONATATE 200 MG/1
200 CAPSULE ORAL 3 TIMES DAILY PRN
Qty: 42 CAPSULE | Refills: 0 | Status: SHIPPED | OUTPATIENT
Start: 2024-07-15 | End: 2024-08-14

## 2024-07-15 ASSESSMENT — ENCOUNTER SYMPTOMS
DIAPHORESIS: 0
CHILLS: 0
DEPRESSION: 0
OCCASIONAL FEELINGS OF UNSTEADINESS: 0
HEADACHES: 1
SINUS PRESSURE: 1
SWOLLEN GLANDS: 0
COUGH: 1
SORE THROAT: 1
LOSS OF SENSATION IN FEET: 0
SHORTNESS OF BREATH: 1
HOARSE VOICE: 0
NECK PAIN: 0

## 2024-07-15 ASSESSMENT — PATIENT HEALTH QUESTIONNAIRE - PHQ9
SUM OF ALL RESPONSES TO PHQ9 QUESTIONS 1 AND 2: 0
2. FEELING DOWN, DEPRESSED OR HOPELESS: NOT AT ALL
1. LITTLE INTEREST OR PLEASURE IN DOING THINGS: NOT AT ALL

## 2024-07-15 ASSESSMENT — COLUMBIA-SUICIDE SEVERITY RATING SCALE - C-SSRS
2. HAVE YOU ACTUALLY HAD ANY THOUGHTS OF KILLING YOURSELF?: NO
1. IN THE PAST MONTH, HAVE YOU WISHED YOU WERE DEAD OR WISHED YOU COULD GO TO SLEEP AND NOT WAKE UP?: NO
6. HAVE YOU EVER DONE ANYTHING, STARTED TO DO ANYTHING, OR PREPARED TO DO ANYTHING TO END YOUR LIFE?: NO

## 2024-07-15 ASSESSMENT — PAIN SCALES - GENERAL: PAINLEVEL: 0-NO PAIN

## 2024-07-15 NOTE — LETTER
July 15, 2024     Patient: Serenity Russell   YOB: 1965   Date of Visit: 7/15/2024       To Whom It May Concern:    Serenity Russell was seen in my clinic on 7/15/2024 at 11:15 am. Please excuse Serenity for her absence from work on this day and 7/16/24 to make the appointment.    If you have any questions or concerns, please don't hesitate to call.         Sincerely,         Shonda Faulkner, LYNDSEY-CNP        CC: No Recipients

## 2024-07-15 NOTE — PROGRESS NOTES
"Subjective   Patient ID: Serenity Russell is a 59 y.o. female who presents for Sinusitis (Chest congestion, weak, cough- non productive, headache, body aches x 2 days).    Sinusitis  This is a new problem. The current episode started in the past 7 days. The problem has been gradually worsening since onset. There has been no fever. Associated symptoms include congestion, coughing, ear pain, headaches, shortness of breath, sinus pressure and a sore throat. Pertinent negatives include no chills, diaphoresis, hoarse voice, neck pain, sneezing or swollen glands. Past treatments include nothing. The treatment provided no relief.        Review of Systems   Constitutional:  Negative for chills and diaphoresis.   HENT:  Positive for congestion, ear pain, sinus pressure and sore throat. Negative for hoarse voice and sneezing.    Respiratory:  Positive for cough and shortness of breath.    Musculoskeletal:  Negative for neck pain.   Neurological:  Positive for headaches.   All other systems reviewed and are negative.      Objective   /76 (BP Location: Left arm, Patient Position: Sitting, BP Cuff Size: Adult)   Pulse 93   Temp 36.4 °C (97.5 °F) (Temporal)   Ht 1.6 m (5' 3\")   Wt 87.2 kg (192 lb 3.2 oz)   SpO2 99%   BMI 34.05 kg/m²     Physical Exam  Vitals reviewed.   Constitutional:       Appearance: Normal appearance.   HENT:      Right Ear: Tympanic membrane, ear canal and external ear normal.      Left Ear: Tympanic membrane, ear canal and external ear normal.      Mouth/Throat:      Mouth: Mucous membranes are moist.      Pharynx: Posterior oropharyngeal erythema present.   Pulmonary:      Effort: Pulmonary effort is normal.      Breath sounds: Normal breath sounds.   Neurological:      Mental Status: She is alert.   Psychiatric:         Mood and Affect: Mood normal.         Behavior: Behavior normal.         Assessment/Plan   Problem List Items Addressed This Visit    None  Visit Diagnoses         Codes    " Acute non-recurrent maxillary sinusitis    -  Primary J01.00    Relevant Medications    azithromycin (Zithromax) 250 mg tablet    Other Relevant Orders    Sars-CoV-2 PCR    Acute cough     R05.1    Relevant Medications    predniSONE (Deltasone) 20 mg tablet    benzonatate (Tessalon) 200 mg capsule

## 2024-07-16 LAB — SARS-COV-2 RNA RESP QL NAA+PROBE: DETECTED

## 2024-07-30 ENCOUNTER — CLINICAL SUPPORT (OUTPATIENT)
Dept: SLEEP MEDICINE | Facility: CLINIC | Age: 59
End: 2024-07-30
Payer: COMMERCIAL

## 2024-07-30 VITALS
HEART RATE: 79 BPM | RESPIRATION RATE: 14 BRPM | OXYGEN SATURATION: 93 % | DIASTOLIC BLOOD PRESSURE: 63 MMHG | SYSTOLIC BLOOD PRESSURE: 98 MMHG

## 2024-07-30 DIAGNOSIS — R29.818 SUSPECTED SLEEP APNEA: Primary | ICD-10-CM

## 2024-07-30 DIAGNOSIS — D75.1 POLYCYTHEMIA: ICD-10-CM

## 2024-07-30 ASSESSMENT — SLEEP AND FATIGUE QUESTIONNAIRES
HOW LIKELY ARE YOU TO NOD OFF OR FALL ASLEEP WHILE WATCHING TV: SLIGHT CHANCE OF DOZING
HOW LIKELY ARE YOU TO NOD OFF OR FALL ASLEEP IN A CAR, WHILE STOPPED FOR A FEW MINUTES IN TRAFFIC: WOULD NEVER DOZE
HOW LIKELY ARE YOU TO NOD OFF OR FALL ASLEEP WHILE SITTING AND TALKING TO SOMEONE: WOULD NEVER DOZE
HOW LIKELY ARE YOU TO NOD OFF OR FALL ASLEEP WHILE LYING DOWN TO REST IN THE AFTERNOON WHEN CIRCUMSTANCES PERMIT: SLIGHT CHANCE OF DOZING
HOW LIKELY ARE YOU TO NOD OFF OR FALL ASLEEP WHEN YOU ARE A PASSENGER IN A CAR FOR AN HOUR WITHOUT A BREAK: MODERATE CHANCE OF DOZING
ESS-CHAD TOTAL SCORE: 7
HOW LIKELY ARE YOU TO NOD OFF OR FALL ASLEEP WHILE SITTING AND READING: SLIGHT CHANCE OF DOZING
HOW LIKELY ARE YOU TO NOD OFF OR FALL ASLEEP WHILE SITTING QUIETLY AFTER LUNCH WITHOUT ALCOHOL: SLIGHT CHANCE OF DOZING
SITING INACTIVE IN A PUBLIC PLACE LIKE A CLASS ROOM OR A MOVIE THEATER: SLIGHT CHANCE OF DOZING

## 2024-07-30 NOTE — PROGRESS NOTES
Call from  sleep lab in [place], [place] requesting order for split-night sleep study for tonight instead of existing order for CPAP titration. [place] sleep lab staff (Alpa) relays that pt denies any prior sleep studies or prior CPAP therapy (and none prior per chart review). Therefore, a split-night PSG will be ordered. Discussed with attending Dr. Garcia who is in agreement with this plan.

## 2024-07-31 NOTE — PROGRESS NOTES
[facility] TECH NOTE:     Patient: Serenity Russell   MRN//AGE: [id number]  1965  59 y.o.   Technologist: Alpa Steele RRT-SDS   Room: 106   Service Date: 2024        Sleep Testing Location: [facility]:     TECHNOLOGIST SLEEP STUDY PROCEDURE NOTE:   This sleep study is being conducted according to the policies and procedures outlined by the AAS accreditation standards.  The sleep study procedure and processes involved during this appointment was explained to the patient/patient’s family, questions were answered. The patient/family verbalized understanding.      The patient is a 59 y.o. year old female scheduled for a Diagnostic PSG Split night. she arrived for her appointment.      The study that was ultimately completed was a Diagnostic PSG .    The full study Was completed.  Patient questionnaires completed?: yes     Consents signed? yes    Initial Fall Risk Screening:     Serenity has not fallen in the last 6 months. her did not result in injury. Serenity does not have a fear of falling. He does not need assistance with sitting, standing, or walking. she does not need assistance walking in her home. she does not need assistance in an unfamiliar setting. The patient is notusing an assistive device.     Brief Study observations: Patient did not qualify for a Split study tonight. Patient had Arousals of Respiratory, Spontaneous and Limb movements. Patients Respiratory events were Hypopneas and Flow limitations. Patient had intermittent, mild to loud snoring. Patient was up 0 times throughout the night. Patient did go in REM.     Deviation to order/protocol and reason:       If PAP, which was preferred mask/pressure/mode:       Other:None    After the procedure, the patient/family was informed to ensure followup with ordering clinician for testing results.      Technologist: Alpa Steele RRT-SDS

## 2024-08-06 DIAGNOSIS — E11.9 TYPE 2 DIABETES MELLITUS WITHOUT COMPLICATION, WITHOUT LONG-TERM CURRENT USE OF INSULIN (MULTI): ICD-10-CM

## 2024-08-14 ENCOUNTER — PATIENT MESSAGE (OUTPATIENT)
Dept: BEHAVIORAL HEALTH | Facility: CLINIC | Age: 59
End: 2024-08-14
Payer: COMMERCIAL

## 2024-08-14 DIAGNOSIS — F43.10 PTSD (POST-TRAUMATIC STRESS DISORDER): ICD-10-CM

## 2024-08-14 DIAGNOSIS — F41.1 GAD (GENERALIZED ANXIETY DISORDER): ICD-10-CM

## 2024-08-14 DIAGNOSIS — F33.1 MODERATE EPISODE OF RECURRENT MAJOR DEPRESSIVE DISORDER (MULTI): ICD-10-CM

## 2024-08-14 RX ORDER — PAROXETINE 30 MG/1
30 TABLET, FILM COATED ORAL DAILY
Qty: 30 TABLET | Refills: 0 | Status: SHIPPED | OUTPATIENT
Start: 2024-08-14 | End: 2024-09-13

## 2024-08-18 DIAGNOSIS — F33.1 MODERATE EPISODE OF RECURRENT MAJOR DEPRESSIVE DISORDER (MULTI): ICD-10-CM

## 2024-08-19 RX ORDER — BUPROPION HYDROCHLORIDE 150 MG/1
150 TABLET ORAL DAILY
Qty: 30 TABLET | Refills: 0 | Status: SHIPPED | OUTPATIENT
Start: 2024-08-19

## 2024-08-29 ENCOUNTER — APPOINTMENT (OUTPATIENT)
Dept: BEHAVIORAL HEALTH | Facility: CLINIC | Age: 59
End: 2024-08-29
Payer: COMMERCIAL

## 2024-08-29 DIAGNOSIS — F43.10 PTSD (POST-TRAUMATIC STRESS DISORDER): ICD-10-CM

## 2024-08-29 DIAGNOSIS — F33.1 MODERATE EPISODE OF RECURRENT MAJOR DEPRESSIVE DISORDER (MULTI): ICD-10-CM

## 2024-08-29 DIAGNOSIS — F41.1 GAD (GENERALIZED ANXIETY DISORDER): ICD-10-CM

## 2024-08-29 PROCEDURE — 4010F ACE/ARB THERAPY RXD/TAKEN: CPT | Performed by: PSYCHIATRY & NEUROLOGY

## 2024-08-29 PROCEDURE — 99214 OFFICE O/P EST MOD 30 MIN: CPT | Performed by: PSYCHIATRY & NEUROLOGY

## 2024-08-29 PROCEDURE — 1036F TOBACCO NON-USER: CPT | Performed by: PSYCHIATRY & NEUROLOGY

## 2024-08-29 RX ORDER — BUPROPION HYDROCHLORIDE 300 MG/1
300 TABLET ORAL DAILY
Qty: 90 TABLET | Refills: 0 | Status: SHIPPED | OUTPATIENT
Start: 2024-08-29 | End: 2024-11-27

## 2024-08-29 RX ORDER — PAROXETINE HYDROCHLORIDE HEMIHYDRATE 25 MG/1
50 TABLET, FILM COATED, EXTENDED RELEASE ORAL EVERY MORNING
Qty: 180 TABLET | Refills: 0 | Status: SHIPPED | OUTPATIENT
Start: 2024-08-29 | End: 2024-11-27

## 2024-08-29 ASSESSMENT — PATIENT HEALTH QUESTIONNAIRE - PHQ9
SUM OF ALL RESPONSES TO PHQ QUESTIONS 1-9: 11
3. TROUBLE FALLING OR STAYING ASLEEP OR SLEEPING TOO MUCH: SEVERAL DAYS
4. FEELING TIRED OR HAVING LITTLE ENERGY: NEARLY EVERY DAY
1. LITTLE INTEREST OR PLEASURE IN DOING THINGS: SEVERAL DAYS
SUM OF ALL RESPONSES TO PHQ9 QUESTIONS 1 AND 2: 3
9. THOUGHTS THAT YOU WOULD BE BETTER OFF DEAD, OR OF HURTING YOURSELF: SEVERAL DAYS
7. TROUBLE CONCENTRATING ON THINGS, SUCH AS READING THE NEWSPAPER OR WATCHING TELEVISION: NOT AT ALL
8. MOVING OR SPEAKING SO SLOWLY THAT OTHER PEOPLE COULD HAVE NOTICED. OR THE OPPOSITE, BEING SO FIGETY OR RESTLESS THAT YOU HAVE BEEN MOVING AROUND A LOT MORE THAN USUAL: NOT AT ALL
10. IF YOU CHECKED OFF ANY PROBLEMS, HOW DIFFICULT HAVE THESE PROBLEMS MADE IT FOR YOU TO DO YOUR WORK, TAKE CARE OF THINGS AT HOME, OR GET ALONG WITH OTHER PEOPLE: SOMEWHAT DIFFICULT
5. POOR APPETITE OR OVEREATING: MORE THAN HALF THE DAYS
6. FEELING BAD ABOUT YOURSELF - OR THAT YOU ARE A FAILURE OR HAVE LET YOURSELF OR YOUR FAMILY DOWN: SEVERAL DAYS
2. FEELING DOWN, DEPRESSED OR HOPELESS: MORE THAN HALF THE DAYS

## 2024-08-29 ASSESSMENT — ENCOUNTER SYMPTOMS
DYSPHORIC MOOD: 0
NERVOUS/ANXIOUS: 0

## 2024-08-29 NOTE — PROGRESS NOTES
"Adult Ambulatory Psychiatry Progress Note      Assessment/Plan     Impression:  Serenity Russell is a 59 y.o. female domiciled with fiance and his son, employed who presents for follow up with CC of Depression, Anxiety, and Insomnia.          Plan:   Anxiety/depression/ PTSD - paroxetine CR 50mg daily, wellbutrin XL 300mg daily, c/w med ed, psycho ed and supportive psychotherapy, f/u 5 weeks  Insomnia - pt uses medical marijuana gummies, trazodone 100mg qhs prn       Subjective   HPI:  Pt arrived on time. Mood \"in a funk this past week\". The week before she felt better. Nothing is going on that she can attribute her mood change. She's had some depressed mood. Denies SI. Anxiety is worse. She thought she was having a heart attack on Sunday. She had chest pain, took an ativan and it went away. She's had them in the past but they're rare. 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 She isn't seeing a therapist right now. Provided resources. Discussed increasing wellbutrin.           Review of Systems   Psychiatric/Behavioral:  Negative for dysphoric mood and suicidal ideas. The patient is not nervous/anxious.            Objective   Mental Status Exam:  General Appearance: Well groomed, appropriate eye contact  Attitude/Behavior: Cooperative  Motor: No psychomotor agitation or retardation, no tremor or other abnormal movements  Speech: Normal rate, volume, prosody  Mood: \"in a funk this past week\"  Affect: Constricted  Thought Process: Linear, goal directed  Thought Associations: No loosening of associations  Thought Content: Normal  Perception: No perceptual abnormalities noted  Insight: Intact  Judgement: Intact      Vitals:  There were no vitals filed for this visit.    Current Medications:  Current Outpatient Medications on File Prior to Visit   Medication Sig Dispense Refill    cetirizine (ZyrTEC) 10 mg tablet Take 1 tablet (10 mg) by mouth once daily as needed.      dulaglutide (Trulicity) 3 mg/0.5 mL pen injector INJECT 3 " MG UNDER THE SKIN 1 TIME PER WEEK 2 mL 2    empagliflozin (Jardiance) 25 mg Take 1 tablet (25 mg) by mouth once daily. 90 tablet 1    EPINEPHrine 0.3 mg/0.3 mL injection syringe Inject 0.3 mL (0.3 mg) into the muscle 1 time if needed.      fluticasone (Flonase) 50 mcg/actuation nasal spray Administer 1 spray into each nostril once daily.      levothyroxine (Synthroid, Levoxyl) 100 mcg tablet TAKE ONE TABLET BY MOUTH IN THE MORNING ON AN EMPTY STOMACH 90 tablet 3    lisinopril 2.5 mg tablet TAKE ONE TABLET BY MOUTH ONCE A DAY 90 tablet 1    loratadine (Claritin) 10 mg tablet Take 1 tablet (10 mg) by mouth once daily.      nortriptyline (Pamelor) 10 mg capsule Take 1 capsule (10 mg) by mouth once daily at bedtime.      ondansetron (Zofran) 4 mg tablet TAKE ONE (1) TABLET BY MOUTH EVERY 8 HOURS IF NEEDED FOR NAUSEA OR VOMITING FOR UP TO 7 DAYS 20 tablet 0    Pataday Twice Daily Relief 0.1 % ophthalmic solution INSTILL ONE DROP INTO EACH EYE TWICE DAILY AS NEEDED FOR ITCHY EYES      traZODone (Desyrel) 100 mg tablet Take 1 tablet (100 mg) by mouth once daily at bedtime. 30 tablet 2    [DISCONTINUED] buPROPion XL (Wellbutrin XL) 150 mg 24 hr tablet TAKE ONE TABLET BY MOUTH EVERY DAY. DO NOT CRUSH, CHEW OR SPLIT 30 tablet 0    [DISCONTINUED] PARoxetine (Paxil) 30 mg tablet Take 1 tablet (30 mg) by mouth once daily. 30 tablet 0     No current facility-administered medications on file prior to visit.       Lab Review:   Office Visit on 07/15/2024   Component Date Value    Coronavirus 2019, PCR 07/15/2024 Detected (A)        Orders:  Diagnoses and all orders for this visit:  Moderate episode of recurrent major depressive disorder (Multi)  -     Follow Up In Psychiatry  -     buPROPion XL (Wellbutrin XL) 300 mg 24 hr tablet; Take 1 tablet (300 mg) by mouth once daily. do not crush chew or split  -     PARoxetine CR (Paxil CR) 25 mg 24 hr tablet; Take 2 tablets (50 mg) by mouth once daily in the morning. Do not crush, chew, or  split.  -     Follow Up In Psychiatry; Future  JUDITH (generalized anxiety disorder)  -     PARoxetine CR (Paxil CR) 25 mg 24 hr tablet; Take 2 tablets (50 mg) by mouth once daily in the morning. Do not crush, chew, or split.  PTSD (post-traumatic stress disorder)  -     PARoxetine CR (Paxil CR) 25 mg 24 hr tablet; Take 2 tablets (50 mg) by mouth once daily in the morning. Do not crush, chew, or split.        PHQ9  Over the past 2 weeks, how often have you been bothered by any of the following problems?  Little interest or pleasure in doing things: Several days  Feeling down, depressed, or hopeless: More than half the days  Trouble falling or staying asleep, or sleeping too much: Several days  Feeling tired or having little energy: Nearly every day  Poor appetite or overeating: More than half the days  Feeling bad about yourself - or that you are a failure or have let yourself or your family down: Several days  Trouble concentrating on things, such as reading the newspaper or watching television: Not at all  Moving or speaking so slowly that other people could have noticed? Or the opposite - being so fidgety or restless that you have been moving around a lot more than usual.: Not at all  Thoughts that you would be better off dead or hurting yourself in some way: Several days  Patient Health Questionnaire-9 Score: 11      Next Appointment:  Follow up in 5 weeks (on 10/3/2024).

## 2024-09-09 ENCOUNTER — APPOINTMENT (OUTPATIENT)
Dept: OTOLARYNGOLOGY | Facility: CLINIC | Age: 59
End: 2024-09-09
Payer: COMMERCIAL

## 2024-09-09 DIAGNOSIS — H69.93 DISORDER OF BOTH EUSTACHIAN TUBES: Primary | ICD-10-CM

## 2024-09-09 PROCEDURE — 1036F TOBACCO NON-USER: CPT | Performed by: OTOLARYNGOLOGY

## 2024-09-09 PROCEDURE — 4010F ACE/ARB THERAPY RXD/TAKEN: CPT | Performed by: OTOLARYNGOLOGY

## 2024-09-09 PROCEDURE — 99204 OFFICE O/P NEW MOD 45 MIN: CPT | Performed by: OTOLARYNGOLOGY

## 2024-09-09 NOTE — PROGRESS NOTES
"History Of Present Illness  Serenity Russell is a 59 y.o. female presenting with: \"My ears are always plugged, I popped my ears to 100 times a day\". These have been going on for years. She is kindly referred by LYNDSEY Patel-CNP.     My impression, patient has bilateral eustachian tube dysfunction.  On today's examination ear drums look intact bilaterally and nasal passages look open bilaterally.  She has allergies. She had bilateral intratympanic steroid injections.      Plan  1-follow-up in 2 weeks     Past Medical History  She has a past medical history of Cancer (Multi), Diabetes mellitus (Multi), Other disorders of vitreous body, Unspecified disorder of refraction, and Unspecified macular degeneration.    Surgical History  She has a past surgical history that includes CT guided percutaneous biopsy mediastinum (10/03/2017); US guided percutaneous peritoneal or retroperitoneal fluid collection drainage (09/21/2017); US guided abscess drain (09/21/2017); and Cataract extraction w/  intraocular lens implant (Bilateral).     Social History  She reports that she quit smoking about 16 years ago. Her smoking use included cigarettes. She has never used smokeless tobacco. She reports that she does not currently use alcohol. She reports current drug use. Frequency: 7.00 times per week. Drug: Marijuana.    Family History  Family History   Problem Relation Name Age of Onset    Cancer Mother      Diabetes Mother      Cancer Father      No Known Problems Sister      No Known Problems Sister      No Known Problems Brother      No Known Problems Brother          Allergies  Adhesive    Review of Systems   Difficulty hearing  Ears feel full  Sinus pressure  Snoring  Postnasal drip  Hoarseness  Dry mouth/mouth breathing  Shortness of breath  Shortness of breath on exertion  Heartburn  Nausea  Vomiting  Weakness  Sleep disturbance  Anxiety  Depression     Physical Exam    General appearance: Healthy-appearing, " "well-nourished, well groomed, in no acute distress.     Head and Face: Atraumatic with no masses, lesions, or scarring.      Salivary glands: No tenderness of the parotid glands or parotid masses.     No tenderness of the submandibular glands or submandibular masses.      Facial strength: Normal strength and symmetry, no synkinesis or facial tic.     Eyes: Conjunctivas look non-hyperemic bilaterally    Ears: Bilaterally ear canals look normal. Tympanic membranes look intact, no hyperemia, fluid or retraction. Hearing grossly normal.      Nose: Mucosa looks normal. No purulent discharge. Septum essentially straight.     Oral Cavity/Mouth: Lips and tongue look normal.     Throat: No postnasal discharge. No tonsil hypertrophy. No hyperemia.    Neck: Symmetrical, trachea midline.     Pulmonary: Normal respiratory effort.     Lymphatic: No palpable pathologic lymph nodes at neck.     Neurological/Psychiatric Orientation to person, place, and time: Normal.     Mood and affect: Normal.      Extremities: No clubbing.     Skin: No significant skin lesions were noted at face or neck        Procedure  MINIMALLY INVASIVE MYRINGOTOMY and INTRATYMPANIC STEROID INJECTION  Operative microscope was brought to patient's left ear. Topical phenol was applied posterosuperiorly, then myringotomy was done using 25 g needle and ~0.4 ml 10mg/ml dexamethasone mixed with 40 mg/ml methylprednisolone was injected intratympanically. Antibiotic drops were applied.    Operative microscope was brought to patient's left ear. Topical phenol was applied posterosuperiorly, then myringotomy was done using 25 g needle and ~0.4 ml 10mg/ml dexamethasone mixed with 40 mg/ml methylprednisolone was injected intratympanically. Antibiotic drops were applied.    Procedure was concluded.         Last Recorded Vitals  There were no vitals taken for this visit.    Relevant Results    Assessment and Plan:  Serenity Russell is a 59 y.o. female presenting with: \"My " "ears are always plugged, I popped my ears to 100 times a day\". These have been going on for years. She is kindly referred by LYNDSEY Patel-CNP.     My impression, patient has bilateral eustachian tube dysfunction.  On today's examination ear drums look intact bilaterally and nasal passages look open bilaterally.  She has allergies. She had bilateral intratympanic steroid injections.      Plan  1-follow-up in 2 weeks      Kathryn Alberto  Otolaryngology - Head & Neck Surgery  "

## 2024-09-15 DIAGNOSIS — G47.00 INSOMNIA, UNSPECIFIED TYPE: ICD-10-CM

## 2024-09-16 RX ORDER — TRAZODONE HYDROCHLORIDE 100 MG/1
100 TABLET ORAL NIGHTLY
Qty: 30 TABLET | Refills: 0 | Status: SHIPPED | OUTPATIENT
Start: 2024-09-16

## 2024-09-27 ENCOUNTER — APPOINTMENT (OUTPATIENT)
Dept: OTOLARYNGOLOGY | Facility: CLINIC | Age: 59
End: 2024-09-27
Payer: COMMERCIAL

## 2024-10-03 ENCOUNTER — APPOINTMENT (OUTPATIENT)
Dept: BEHAVIORAL HEALTH | Facility: CLINIC | Age: 59
End: 2024-10-03
Payer: COMMERCIAL

## 2024-10-03 ENCOUNTER — OFFICE VISIT (OUTPATIENT)
Dept: PRIMARY CARE | Facility: CLINIC | Age: 59
End: 2024-10-03
Payer: COMMERCIAL

## 2024-10-03 VITALS
HEIGHT: 63 IN | RESPIRATION RATE: 20 BRPM | HEART RATE: 87 BPM | TEMPERATURE: 97.7 F | BODY MASS INDEX: 33.66 KG/M2 | OXYGEN SATURATION: 98 % | WEIGHT: 190 LBS

## 2024-10-03 DIAGNOSIS — F41.1 GAD (GENERALIZED ANXIETY DISORDER): ICD-10-CM

## 2024-10-03 DIAGNOSIS — I10 ESSENTIAL HYPERTENSION: ICD-10-CM

## 2024-10-03 DIAGNOSIS — E66.09 CLASS 1 OBESITY DUE TO EXCESS CALORIES WITH SERIOUS COMORBIDITY AND BODY MASS INDEX (BMI) OF 33.0 TO 33.9 IN ADULT: ICD-10-CM

## 2024-10-03 DIAGNOSIS — E11.9 DIABETES MELLITUS WITHOUT COMPLICATION (MULTI): ICD-10-CM

## 2024-10-03 DIAGNOSIS — F33.1 MODERATE EPISODE OF RECURRENT MAJOR DEPRESSIVE DISORDER: ICD-10-CM

## 2024-10-03 DIAGNOSIS — F43.10 PTSD (POST-TRAUMATIC STRESS DISORDER): ICD-10-CM

## 2024-10-03 DIAGNOSIS — E66.811 CLASS 1 OBESITY DUE TO EXCESS CALORIES WITH SERIOUS COMORBIDITY AND BODY MASS INDEX (BMI) OF 33.0 TO 33.9 IN ADULT: ICD-10-CM

## 2024-10-03 DIAGNOSIS — E11.9 TYPE 2 DIABETES MELLITUS WITHOUT COMPLICATION, WITHOUT LONG-TERM CURRENT USE OF INSULIN (MULTI): Primary | ICD-10-CM

## 2024-10-03 DIAGNOSIS — G47.00 INSOMNIA, UNSPECIFIED TYPE: ICD-10-CM

## 2024-10-03 LAB — POC HEMOGLOBIN A1C: 6.9 % (ref 4.2–6.5)

## 2024-10-03 PROCEDURE — 4010F ACE/ARB THERAPY RXD/TAKEN: CPT | Performed by: PSYCHIATRY & NEUROLOGY

## 2024-10-03 PROCEDURE — 3008F BODY MASS INDEX DOCD: CPT | Performed by: FAMILY MEDICINE

## 2024-10-03 PROCEDURE — 99214 OFFICE O/P EST MOD 30 MIN: CPT | Performed by: FAMILY MEDICINE

## 2024-10-03 PROCEDURE — 83036 HEMOGLOBIN GLYCOSYLATED A1C: CPT | Performed by: FAMILY MEDICINE

## 2024-10-03 PROCEDURE — 99214 OFFICE O/P EST MOD 30 MIN: CPT | Performed by: PSYCHIATRY & NEUROLOGY

## 2024-10-03 PROCEDURE — 1036F TOBACCO NON-USER: CPT | Performed by: PSYCHIATRY & NEUROLOGY

## 2024-10-03 PROCEDURE — 4010F ACE/ARB THERAPY RXD/TAKEN: CPT | Performed by: FAMILY MEDICINE

## 2024-10-03 RX ORDER — TRAZODONE HYDROCHLORIDE 100 MG/1
100 TABLET ORAL NIGHTLY
Qty: 90 TABLET | Refills: 0 | Status: SHIPPED | OUTPATIENT
Start: 2024-10-03 | End: 2025-01-01

## 2024-10-03 RX ORDER — DULAGLUTIDE 4.5 MG/.5ML
4.5 INJECTION, SOLUTION SUBCUTANEOUS WEEKLY
Qty: 2 ML | Refills: 11 | Status: SHIPPED | OUTPATIENT
Start: 2024-10-03

## 2024-10-03 RX ORDER — ARIPIPRAZOLE 2 MG/1
2 TABLET ORAL DAILY
Qty: 30 TABLET | Refills: 2 | Status: SHIPPED | OUTPATIENT
Start: 2024-10-03 | End: 2025-01-01

## 2024-10-03 RX ORDER — BUPROPION HYDROCHLORIDE 150 MG/1
150 TABLET ORAL DAILY
Qty: 90 TABLET | Refills: 0 | Status: SHIPPED | OUTPATIENT
Start: 2024-10-03 | End: 2025-01-01

## 2024-10-03 RX ORDER — PAROXETINE HYDROCHLORIDE HEMIHYDRATE 25 MG/1
50 TABLET, FILM COATED, EXTENDED RELEASE ORAL EVERY MORNING
Qty: 180 TABLET | Refills: 0 | Status: SHIPPED | OUTPATIENT
Start: 2024-10-03 | End: 2025-01-01

## 2024-10-03 ASSESSMENT — PATIENT HEALTH QUESTIONNAIRE - PHQ9
8. MOVING OR SPEAKING SO SLOWLY THAT OTHER PEOPLE COULD HAVE NOTICED. OR THE OPPOSITE, BEING SO FIGETY OR RESTLESS THAT YOU HAVE BEEN MOVING AROUND A LOT MORE THAN USUAL: NOT AT ALL
5. POOR APPETITE OR OVEREATING: NEARLY EVERY DAY
2. FEELING DOWN, DEPRESSED OR HOPELESS: NOT AT ALL
6. FEELING BAD ABOUT YOURSELF - OR THAT YOU ARE A FAILURE OR HAVE LET YOURSELF OR YOUR FAMILY DOWN: MORE THAN HALF THE DAYS
9. THOUGHTS THAT YOU WOULD BE BETTER OFF DEAD, OR OF HURTING YOURSELF: SEVERAL DAYS
1. LITTLE INTEREST OR PLEASURE IN DOING THINGS: SEVERAL DAYS
10. IF YOU CHECKED OFF ANY PROBLEMS, HOW DIFFICULT HAVE THESE PROBLEMS MADE IT FOR YOU TO DO YOUR WORK, TAKE CARE OF THINGS AT HOME, OR GET ALONG WITH OTHER PEOPLE: VERY DIFFICULT
4. FEELING TIRED OR HAVING LITTLE ENERGY: NEARLY EVERY DAY
7. TROUBLE CONCENTRATING ON THINGS, SUCH AS READING THE NEWSPAPER OR WATCHING TELEVISION: SEVERAL DAYS
1. LITTLE INTEREST OR PLEASURE IN DOING THINGS: NOT AT ALL
2. FEELING DOWN, DEPRESSED OR HOPELESS: MORE THAN HALF THE DAYS
SUM OF ALL RESPONSES TO PHQ9 QUESTIONS 1 AND 2: 3
3. TROUBLE FALLING OR STAYING ASLEEP OR SLEEPING TOO MUCH: MORE THAN HALF THE DAYS
SUM OF ALL RESPONSES TO PHQ QUESTIONS 1-9: 15
SUM OF ALL RESPONSES TO PHQ9 QUESTIONS 1 AND 2: 0

## 2024-10-03 ASSESSMENT — ENCOUNTER SYMPTOMS
DEPRESSION: 0
NERVOUS/ANXIOUS: 0
OCCASIONAL FEELINGS OF UNSTEADINESS: 0
LOSS OF SENSATION IN FEET: 0
DYSPHORIC MOOD: 0

## 2024-10-03 ASSESSMENT — PAIN SCALES - GENERAL: PAINLEVEL: 0-NO PAIN

## 2024-10-03 NOTE — PROGRESS NOTES
"Subjective   Patient ID: Serenity Russell is a 59 y.o. female who presents for Diabetes (PATIENT HERE FOR DIABETIC CHECK AND SHE WOULD LIKE FUTURE ORDERS PUT IN SO SHE CAN GET BLOOD WORK IN [place] ONE MORNING).    HPI she is still having glucose cravings etc but doesn't always act on them.  She does some light activity a couple of times a week.  Eye exan last Nov and has one next month.  A1c today is 6.9 up from 6.4    Review of Systems   Constitutional: Negative.    HENT: Negative.     Respiratory: Negative.     Cardiovascular: Negative.    Gastrointestinal: Negative.    Genitourinary: Negative.    Musculoskeletal: Negative.    Neurological: Negative.        Objective   Pulse 87   Temp 36.5 °C (97.7 °F) (Skin)   Resp 20   Ht 1.6 m (5' 3\")   Wt 86.2 kg (190 lb)   SpO2 98%   BMI 33.66 kg/m²     Physical Exam  Constitutional:       General: She is not in acute distress.     Appearance: Normal appearance.   Cardiovascular:      Rate and Rhythm: Normal rate and regular rhythm.      Heart sounds: No murmur heard.  Pulmonary:      Breath sounds: Normal breath sounds. No wheezing.   Neurological:      Mental Status: She is alert.         Assessment/Plan   Problem List Items Addressed This Visit             ICD-10-CM    Type 2 diabetes mellitus - Primary E11.9    Relevant Medications    dulaglutide (Trulicity) 4.5 mg/0.5 mL pen injector    Other Relevant Orders    POCT glycosylated hemoglobin (Hb A1C) manually resulted (Completed)    Lipid Panel    Comprehensive Metabolic Panel    Essential hypertension I10     Other Visit Diagnoses         Codes    Diabetes mellitus without complication (Multi)     E11.9    Relevant Orders    Albumin-Creatinine Ratio, Urine Random    Class 1 obesity due to excess calories with serious comorbidity and body mass index (BMI) of 33.0 to 33.9 in adult     E66.811, E66.09, Z68.33          Increase trulicity to 4.5 and recheck 3 months.  Continue to work on good diet and wt loss.      "

## 2024-10-03 NOTE — PROGRESS NOTES
"Adult Ambulatory Psychiatry Progress Note    Virtual or Telephone Consent    An interactive audio and video telecommunication system which permits real time communications between the patient (at the originating site) and provider (at the distant site) was utilized to provide this telehealth service.   Verbal consent was requested and obtained from Serenity Russell on this date, 10/03/24 for a telehealth visit.      Assessment/Plan     Impression:  Serenity Russell is a 59 y.o. female domiciled with fiance and his son, employed who presents for follow up with CC of Depression, Anxiety, PTSD (Post-Traumatic Stress Disorder), and Insomnia.          Plan:   Anxiety/depression/ PTSD - paroxetine CR 50mg daily, reduce to wellbutrin XL 150mg daily, restart abilify 2mg daily, c/w med ed, psycho ed and supportive psychotherapy, f/u 3 months  Insomnia - pt uses medical marijuana gummies, trazodone 100mg qhs prn       Subjective   HPI:  Pt arrived on time. Mood \"depressed\" since last session. She's feeling depressed and a lot of anxiety. She felt panicked with tingling in her hands at [employer]. She doesn't know why. She found a therapist in [place] and they've met 3 times. Discussed how the worsening depression and anxiety seem to have started with her returning to work. Pt has a lot of stress from her two bosses. One is \"crazy\" and the other micromanages. Discussed how increasing wellbutrin may have worsened the anxiety. Discussed restarting abilify.           Review of Systems   Psychiatric/Behavioral:  Negative for dysphoric mood and suicidal ideas. The patient is not nervous/anxious.            Objective   Mental Status Exam:  General Appearance: Well groomed, appropriate eye contact  Attitude/Behavior: Cooperative  Motor: No psychomotor agitation or retardation, no tremor or other abnormal movements  Speech: Normal rate, volume, prosody  Mood: depressed  Affect: Constricted  Thought Process: Linear, goal directed  Thought " Associations: No loosening of associations  Thought Content: Normal  Perception: No perceptual abnormalities noted  Insight: Intact  Judgement: Intact      Vitals:  There were no vitals filed for this visit.    Current Medications:  Current Outpatient Medications on File Prior to Visit   Medication Sig Dispense Refill    cetirizine (ZyrTEC) 10 mg tablet Take 1 tablet (10 mg) by mouth once daily as needed.      dulaglutide (Trulicity) 3 mg/0.5 mL pen injector INJECT 3 MG UNDER THE SKIN 1 TIME PER WEEK 2 mL 2    empagliflozin (Jardiance) 25 mg Take 1 tablet (25 mg) by mouth once daily. 90 tablet 1    EPINEPHrine 0.3 mg/0.3 mL injection syringe Inject 0.3 mL (0.3 mg) into the muscle 1 time if needed.      fluticasone (Flonase) 50 mcg/actuation nasal spray Administer 1 spray into each nostril once daily.      levothyroxine (Synthroid, Levoxyl) 100 mcg tablet TAKE ONE TABLET BY MOUTH IN THE MORNING ON AN EMPTY STOMACH 90 tablet 3    lisinopril 2.5 mg tablet TAKE ONE TABLET BY MOUTH ONCE A DAY 90 tablet 1    loratadine (Claritin) 10 mg tablet Take 1 tablet (10 mg) by mouth once daily.      nortriptyline (Pamelor) 10 mg capsule Take 1 capsule (10 mg) by mouth once daily at bedtime.      ondansetron (Zofran) 4 mg tablet TAKE ONE (1) TABLET BY MOUTH EVERY 8 HOURS IF NEEDED FOR NAUSEA OR VOMITING FOR UP TO 7 DAYS 20 tablet 0    Pataday Twice Daily Relief 0.1 % ophthalmic solution INSTILL ONE DROP INTO EACH EYE TWICE DAILY AS NEEDED FOR ITCHY EYES      [DISCONTINUED] buPROPion XL (Wellbutrin XL) 300 mg 24 hr tablet Take 1 tablet (300 mg) by mouth once daily. do not crush chew or split 90 tablet 0    [DISCONTINUED] PARoxetine CR (Paxil CR) 25 mg 24 hr tablet Take 2 tablets (50 mg) by mouth once daily in the morning. Do not crush, chew, or split. 180 tablet 0    [DISCONTINUED] traZODone (Desyrel) 100 mg tablet TAKE ONE TABLET BY MOUTH AT BEDTIME 30 tablet 0     No current facility-administered medications on file prior to visit.        Lab Review:   No visits with results within 2 Month(s) from this visit.   Latest known visit with results is:   Office Visit on 07/15/2024   Component Date Value    Coronavirus 2019, PCR 07/15/2024 Detected (A)        Orders:  Diagnoses and all orders for this visit:  Moderate episode of recurrent major depressive disorder  -     Follow Up In Psychiatry  -     buPROPion XL (Wellbutrin XL) 150 mg 24 hr tablet; Take 1 tablet (150 mg) by mouth once daily. do not crush chew or split  -     Follow Up In Psychiatry; Future  -     ARIPiprazole (Abilify) 2 mg tablet; Take 1 tablet (2 mg) by mouth once daily.  -     PARoxetine CR (Paxil CR) 25 mg 24 hr tablet; Take 2 tablets (50 mg) by mouth once daily in the morning. Do not crush, chew, or split.  Insomnia, unspecified type  -     traZODone (Desyrel) 100 mg tablet; Take 1 tablet (100 mg) by mouth once daily at bedtime.  JUDITH (generalized anxiety disorder)  -     PARoxetine CR (Paxil CR) 25 mg 24 hr tablet; Take 2 tablets (50 mg) by mouth once daily in the morning. Do not crush, chew, or split.  PTSD (post-traumatic stress disorder)  -     PARoxetine CR (Paxil CR) 25 mg 24 hr tablet; Take 2 tablets (50 mg) by mouth once daily in the morning. Do not crush, chew, or split.        PHQ9  Over the past 2 weeks, how often have you been bothered by any of the following problems?  Little interest or pleasure in doing things: Several days  Feeling down, depressed, or hopeless: More than half the days  Trouble falling or staying asleep, or sleeping too much: More than half the days  Feeling tired or having little energy: Nearly every day  Poor appetite or overeating: Nearly every day  Feeling bad about yourself - or that you are a failure or have let yourself or your family down: More than half the days  Trouble concentrating on things, such as reading the newspaper or watching television: Several days  Moving or speaking so slowly that other people could have noticed? Or the  opposite - being so fidgety or restless that you have been moving around a lot more than usual.: Not at all  Thoughts that you would be better off dead or hurting yourself in some way: Several days  Patient Health Questionnaire-9 Score: 15      Next Appointment:  Follow up in 3 months (on 12/24/2024).

## 2024-10-06 ASSESSMENT — ENCOUNTER SYMPTOMS
NEUROLOGICAL NEGATIVE: 1
CARDIOVASCULAR NEGATIVE: 1
GASTROINTESTINAL NEGATIVE: 1
MUSCULOSKELETAL NEGATIVE: 1
RESPIRATORY NEGATIVE: 1
CONSTITUTIONAL NEGATIVE: 1

## 2024-10-14 ENCOUNTER — LAB (OUTPATIENT)
Dept: LAB | Facility: LAB | Age: 59
End: 2024-10-14
Payer: COMMERCIAL

## 2024-10-14 DIAGNOSIS — E11.9 DIABETES MELLITUS WITHOUT COMPLICATION (MULTI): ICD-10-CM

## 2024-10-14 DIAGNOSIS — E11.9 TYPE 2 DIABETES MELLITUS WITHOUT COMPLICATION, WITHOUT LONG-TERM CURRENT USE OF INSULIN (MULTI): ICD-10-CM

## 2024-10-14 LAB
ALBUMIN SERPL BCP-MCNC: 4.3 G/DL (ref 3.4–5)
ALP SERPL-CCNC: 65 U/L (ref 33–110)
ALT SERPL W P-5'-P-CCNC: 44 U/L (ref 7–45)
ANION GAP SERPL CALC-SCNC: 9 MMOL/L (ref 10–20)
AST SERPL W P-5'-P-CCNC: 27 U/L (ref 9–39)
BILIRUB SERPL-MCNC: 0.4 MG/DL (ref 0–1.2)
BUN SERPL-MCNC: 12 MG/DL (ref 6–23)
CALCIUM SERPL-MCNC: 9.3 MG/DL (ref 8.6–10.3)
CHLORIDE SERPL-SCNC: 106 MMOL/L (ref 98–107)
CHOLEST SERPL-MCNC: 179 MG/DL (ref 0–199)
CHOLESTEROL/HDL RATIO: 4
CO2 SERPL-SCNC: 26 MMOL/L (ref 21–32)
CREAT SERPL-MCNC: 1.04 MG/DL (ref 0.5–1.05)
CREAT UR-MCNC: 138.2 MG/DL (ref 20–320)
EGFRCR SERPLBLD CKD-EPI 2021: 63 ML/MIN/1.73M*2
GLUCOSE SERPL-MCNC: 156 MG/DL (ref 74–99)
HDLC SERPL-MCNC: 44.8 MG/DL
LDLC SERPL CALC-MCNC: 94 MG/DL
MICROALBUMIN UR-MCNC: <7 MG/L
MICROALBUMIN/CREAT UR: NORMAL MG/G{CREAT}
NON HDL CHOLESTEROL: 134 MG/DL (ref 0–149)
POTASSIUM SERPL-SCNC: 4.3 MMOL/L (ref 3.5–5.3)
PROT SERPL-MCNC: 7.1 G/DL (ref 6.4–8.2)
SODIUM SERPL-SCNC: 137 MMOL/L (ref 136–145)
TRIGL SERPL-MCNC: 199 MG/DL (ref 0–149)
VLDL: 40 MG/DL (ref 0–40)

## 2024-10-14 PROCEDURE — 82043 UR ALBUMIN QUANTITATIVE: CPT

## 2024-10-14 PROCEDURE — 82570 ASSAY OF URINE CREATININE: CPT

## 2024-10-14 PROCEDURE — 36415 COLL VENOUS BLD VENIPUNCTURE: CPT

## 2024-10-21 ENCOUNTER — APPOINTMENT (OUTPATIENT)
Dept: OTOLARYNGOLOGY | Facility: CLINIC | Age: 59
End: 2024-10-21
Payer: COMMERCIAL

## 2024-10-21 DIAGNOSIS — J34.89 NASAL VESTIBULITIS: ICD-10-CM

## 2024-10-21 DIAGNOSIS — H93.8X3 EAR FULLNESS, BILATERAL: Primary | ICD-10-CM

## 2024-10-21 PROCEDURE — 4010F ACE/ARB THERAPY RXD/TAKEN: CPT | Performed by: OTOLARYNGOLOGY

## 2024-10-21 PROCEDURE — 3048F LDL-C <100 MG/DL: CPT | Performed by: OTOLARYNGOLOGY

## 2024-10-21 PROCEDURE — 1036F TOBACCO NON-USER: CPT | Performed by: OTOLARYNGOLOGY

## 2024-10-21 PROCEDURE — 3062F POS MACROALBUMINURIA REV: CPT | Performed by: OTOLARYNGOLOGY

## 2024-10-21 PROCEDURE — 99214 OFFICE O/P EST MOD 30 MIN: CPT | Performed by: OTOLARYNGOLOGY

## 2024-10-21 RX ORDER — MUPIROCIN 20 MG/G
OINTMENT TOPICAL
Qty: 22 G | Refills: 3 | Status: SHIPPED | OUTPATIENT
Start: 2024-10-21

## 2024-10-21 NOTE — PROGRESS NOTES
"History Of Present Illness    10.21.2024: The intratympanic steroid injection did not help. She still pops her ears like 100 times a day. Flushing her nose with gabe pot helps temporarily. Popping her ears does not make a difference in her hearing.    Tinnitus (-)  Dizziness (-)  She has DM, HT    Secondly, she has dried yellowish crusty lesion at inferior part of her right nostril -> Nasal vestibulitis.     On examination, TMs look intact bilaterally.  At this point, we can try balloon dilation of eustachian tubes.    Plan:  1- hearing test  2- ECOG  ______________________________________________________________________    09.09.2024: Serenity Russell is a 59 y.o. female presenting with: \"My ears are always plugged, I popped my ears to 100 times a day\". These have been going on for about 20 years. She is kindly referred by Shonda Faulkner, LYNDSEY-CNP.     My impression, patient has bilateral eustachian tube dysfunction.  On today's examination ear drums look intact bilaterally and nasal passages look open bilaterally.  She has allergies. She had bilateral intratympanic steroid injections.      Plan  1-follow-up in 2 weeks     Past Medical History  She has a past medical history of Cancer (Multi), Diabetes mellitus (Multi), Other disorders of vitreous body, Unspecified disorder of refraction, and Unspecified macular degeneration.    Surgical History  She has a past surgical history that includes CT guided percutaneous biopsy mediastinum (10/03/2017); US guided percutaneous peritoneal or retroperitoneal fluid collection drainage (09/21/2017); US guided abscess drain (09/21/2017); and Cataract extraction w/  intraocular lens implant (Bilateral).     Social History  She reports that she quit smoking about 16 years ago. Her smoking use included cigarettes. She has never used smokeless tobacco. She reports that she does not currently use alcohol. She reports current drug use. Frequency: 7.00 times per week. Drug: " Marijuana.    Family History  Family History   Problem Relation Name Age of Onset    Cancer Mother      Diabetes Mother      Cancer Father      No Known Problems Sister      No Known Problems Sister      No Known Problems Brother      No Known Problems Brother          Allergies  Adhesive    Review of Systems   Difficulty hearing  Ears feel full  Sinus pressure  Snoring  Postnasal drip  Hoarseness  Dry mouth/mouth breathing  Shortness of breath  Shortness of breath on exertion  Heartburn  Nausea  Vomiting  Weakness  Sleep disturbance  Anxiety  Depression     Physical Exam    General appearance: Healthy-appearing, well-nourished, well groomed, in no acute distress.     Head and Face: Atraumatic with no masses, lesions, or scarring.      Salivary glands: No tenderness of the parotid glands or parotid masses.     No tenderness of the submandibular glands or submandibular masses.      Facial strength: Normal strength and symmetry, no synkinesis or facial tic.     Eyes: Conjunctivas look non-hyperemic bilaterally    Ears: Bilaterally ear canals look normal. Tympanic membranes look intact, no hyperemia, fluid or retraction. Hearing grossly normal.      Nose: Mucosa looks normal. No purulent discharge. Septum essentially straight.     Oral Cavity/Mouth: Lips and tongue look normal.     Throat: No postnasal discharge. No tonsil hypertrophy. No hyperemia.    Neck: Symmetrical, trachea midline.     Pulmonary: Normal respiratory effort.     Lymphatic: No palpable pathologic lymph nodes at neck.     Neurological/Psychiatric Orientation to person, place, and time: Normal.     Mood and affect: Normal.      Extremities: No clubbing.     Skin: No significant skin lesions were noted at face or neck        Procedure  MINIMALLY INVASIVE MYRINGOTOMY and INTRATYMPANIC STEROID INJECTION  Operative microscope was brought to patient's left ear. Topical phenol was applied posterosuperiorly, then myringotomy was done using 25 g needle and  "~0.4 ml 10mg/ml dexamethasone mixed with 40 mg/ml methylprednisolone was injected intratympanically. Antibiotic drops were applied.    Operative microscope was brought to patient's left ear. Topical phenol was applied posterosuperiorly, then myringotomy was done using 25 g needle and ~0.4 ml 10mg/ml dexamethasone mixed with 40 mg/ml methylprednisolone was injected intratympanically. Antibiotic drops were applied.    Procedure was concluded.         Last Recorded Vitals  There were no vitals taken for this visit.    Relevant Results    Assessment and Plan:    10.21.2024: The intratympanic steroid injection did not help. She still pops her ears like 100 times a day. Flushing her nose with gabe pot helps temporarily. Popping her ears does not make a difference in her hearing.    Tinnitus (-)  Dizziness (-)  She has DM, HT    Secondly, she has dried yellowish crusty lesion at inferior part of her right nostril -> Nasal vestibulitis.     On examination, TMs look intact bilaterally.  At this point, we can try balloon dilation of eustachian tubes.    Plan:  1- hearing test  2- ECOG  ______________________________________________________________________    Serenity SHAE Russell is a 59 y.o. female presenting with: \"My ears are always plugged, I popped my ears to 100 times a day\". These have been going on for years. She is kindly referred by Shonda Faulkner, APRN-CNP.     My impression, patient has bilateral eustachian tube dysfunction.  On today's examination ear drums look intact bilaterally and nasal passages look open bilaterally.  She has allergies. She had bilateral intratympanic steroid injections.      Plan  1-follow-up in 2 weeks      [facility]  Otolaryngology - Head & Neck Surgery  "

## 2024-10-25 DIAGNOSIS — E11.9 TYPE 2 DIABETES MELLITUS WITHOUT COMPLICATION, WITHOUT LONG-TERM CURRENT USE OF INSULIN (MULTI): Primary | ICD-10-CM

## 2024-10-25 RX ORDER — ATORVASTATIN CALCIUM 10 MG/1
10 TABLET, FILM COATED ORAL DAILY
Qty: 100 TABLET | Refills: 3 | Status: SHIPPED | OUTPATIENT
Start: 2024-10-25 | End: 2025-11-29

## 2024-11-11 ENCOUNTER — APPOINTMENT (OUTPATIENT)
Dept: AUDIOLOGY | Facility: CLINIC | Age: 59
End: 2024-11-11
Payer: COMMERCIAL

## 2024-11-11 DIAGNOSIS — H93.8X3 EAR FULLNESS, BILATERAL: ICD-10-CM

## 2024-11-11 DIAGNOSIS — H90.3 SENSORINEURAL HEARING LOSS (SNHL) OF BOTH EARS: Primary | ICD-10-CM

## 2024-11-11 PROCEDURE — 92557 COMPREHENSIVE HEARING TEST: CPT | Performed by: AUDIOLOGIST

## 2024-11-11 PROCEDURE — 92550 TYMPANOMETRY & REFLEX THRESH: CPT | Performed by: AUDIOLOGIST

## 2024-11-11 ASSESSMENT — PAIN SCALES - GENERAL: PAINLEVEL_OUTOF10: 0 - NO PAIN

## 2024-11-11 ASSESSMENT — PAIN - FUNCTIONAL ASSESSMENT: PAIN_FUNCTIONAL_ASSESSMENT: 0-10

## 2024-11-11 NOTE — PROGRESS NOTES
Serenity Russell, age 59 years, is here today for a hearing evaluation.     Difficulty hearing - yes, both ears for the past couple of years (progressively getting worse)  Tinnitus - no  Excessive noise exposure - no  Chronic ear infections - yes, adulthood  Ear pain - no  Ear drainage - no  Past ear surgery - no  Vertigo - no  Dizziness - no  Past hearing aid use - no  Family history - no    Appointment time: 4-5    Otoscopy revealed clear ear canals with visual inspection of the tympanic membranes bilaterally.    Behavioral hearing evaluation:  Right ear - mild sloping to moderately-severe sensorineural hearing loss 125-8000 Hz  Left ear - mild sloping to severe sensorineural hearing loss 125-8000 Hz    Speech reception thresholds obtained at a level consistent with pure tone thresholds bilaterally.    Word discrimination:  Right ear - excellent (96%)  Left ear - excellent (100%)    Tympanometry:  Right ear - Type A, normal middle ear function  Left ear - Type A, normal middle ear function    Ipsilateral acoustic reflexes:  Probe right - present 500-4000 Hz  Probe left - present 500-4000 Hz    Contralateral acoustic reflexes:  Probe right - present 500-4000 Hz  Probe left - present 500-4000 Hz    Recommendations:  1) Follow up with Dr Alberto once ECOG is complete  2) Re-evaluate hearing annually, to monitor, or sooner if a change in hearing is noted

## 2024-11-12 ENCOUNTER — PATIENT MESSAGE (OUTPATIENT)
Dept: BEHAVIORAL HEALTH | Facility: CLINIC | Age: 59
End: 2024-11-12
Payer: COMMERCIAL

## 2024-11-12 DIAGNOSIS — F33.1 MODERATE EPISODE OF RECURRENT MAJOR DEPRESSIVE DISORDER: ICD-10-CM

## 2024-11-12 NOTE — PROGRESS NOTES
SCREENING AUDITORY BRAINSTEM RESPONSE (ABR) & ELECTROCOCHLEOGRAPHY (ECOG)      Name:  Serenity Russell  :  1965  Age:  59 y.o.  Date of Evaluation:  11/15/2024     IMPRESSIONS     Overall normal examination. A screening ABR testing was within normal limits bilaterally and was negative for any retrocochlear pathologies.  ECOG findings are suggestive of normal cochlear potentials, bilaterally.    RECOMMENDATIONS     Follow up with Kathryn Alberto MD regarding today's findings.  Consider re-evaluation as medically indicated.  Continue monitoring per ENT/PCP preference.    Time: 0430-1670    HISTORY     Patient was seen for screening Auditory Brainstem Response (ABR) and Electrocochleography (ECOG) testing. Case history collected via patient-clinician interview and chart review.    Patient reports longstanding (10+ years) history of her ears feeling plugged up and needing to pop them consistently throughout the day.  Symptoms initially started in the right ear, but later started in the left ear. Patient states that this became worse following intratympanic steroid injection in both ears.  She endorsed ongoing allergies and sinus issues, and mentioned that the right side of her face often feels like it is swollen and full.  Patient noted that she occasionally will notice light ringing tinnitus. This can be present in either ear.  When asked about dizziness, patient noted occasional lightheadedness after standing up and getting out of a chair. This is typically worse when allergies and sinus issues are bad. She denied room spinning vertigo and spontaneous symptoms.  Most recent audiologic evaluation on 2024 by Sarah Lombardo, AuD, CCC-A revealed mild to moderately-severe sensorineural hearing loss int he right ear, and mild to severe sensorineural hearing loss in the left ear. Tympanometry revealed normal middle ear function bilaterally.  Patient reported complying with pre-test  "instructions.    EVALUATION     See ABR & ECOG Raw Data in \"Media\"    TEST RESULTS     All testing was completed while the patient was reclined slightly in sitting position.    OTOSCOPY  Right Ear: clear ear canals.  Left Ear: clear ear canals.       SCREENING AUDITORY BRAINSTEM RESPONSE (ABR) TEST  A screening ABR testing was presented to each ear initially at 90 dBnHL. Replicable Wave I, III and V traces were obtained bilaterally. The latencies were compared.      Ear Presentation Level Wave I  Latency Wave III  Latency Wave V  Latency Wave V Interpeak Latency   Right 95 dB nHL 1.20 ms 3.30 ms 5.27 ms 0.1 ms   Left 95 dB nHL 1.30 ms 3.47 ms 5.37 ms       Interaural Wave V latencies were within normal limits (>0.50 is abnormal).         ELECTROCOCHLEOGRAPHY (ECOG) TEST  ECOG testing was presented initially at 95 dBHL using a click stimuli at a rate of 8.1.  Replicable waveforms were obtained bilaterally. The summating potential/action potential ratio (SP/AP) were compared.     Ear SP/AP Ratios   Right 0.30 & 0.30   Left 0.33 & 0.48     The SP/AP Ratios were consistent with normal cochlear potential, bilaterally.  (>0.53 is abnormal).    Raw data reviewed by a member of the Vestibular & Balance Disorders team. Testing and interpretation of results completed by Siria Logan, CCC-JOAO. It was my pleasure to evaluate this patient.       Siria Logan, CCC-A  Clinical Audiologist  "

## 2024-11-15 ENCOUNTER — CLINICAL SUPPORT (OUTPATIENT)
Dept: AUDIOLOGY | Facility: CLINIC | Age: 59
End: 2024-11-15
Payer: COMMERCIAL

## 2024-11-15 DIAGNOSIS — R42 DIZZINESS: ICD-10-CM

## 2024-11-15 DIAGNOSIS — H93.8X3 EAR FULLNESS, BILATERAL: Primary | ICD-10-CM

## 2024-11-15 PROCEDURE — 92584 ELECTROCOCHLEOGRAPHY: CPT

## 2024-11-15 PROCEDURE — 92653 AEP NEURODIAGNOSTIC I&R: CPT

## 2024-11-18 ENCOUNTER — APPOINTMENT (OUTPATIENT)
Dept: OPHTHALMOLOGY | Facility: CLINIC | Age: 59
End: 2024-11-18
Payer: COMMERCIAL

## 2024-11-19 ENCOUNTER — OFFICE VISIT (OUTPATIENT)
Dept: OPHTHALMOLOGY | Facility: CLINIC | Age: 59
End: 2024-11-19
Payer: COMMERCIAL

## 2024-11-19 DIAGNOSIS — H52.7 REFRACTION ERROR: ICD-10-CM

## 2024-11-19 DIAGNOSIS — H35.30 MACULAR DEGENERATION OF RIGHT EYE, UNSPECIFIED TYPE: ICD-10-CM

## 2024-11-19 DIAGNOSIS — Z96.1 BILATERAL PSEUDOPHAKIA: ICD-10-CM

## 2024-11-19 DIAGNOSIS — E11.9 TYPE 2 DIABETES MELLITUS WITHOUT COMPLICATION, WITHOUT LONG-TERM CURRENT USE OF INSULIN (MULTI): Primary | ICD-10-CM

## 2024-11-19 PROCEDURE — 99214 OFFICE O/P EST MOD 30 MIN: CPT | Performed by: OPHTHALMOLOGY

## 2024-11-19 RX ORDER — FEXOFENADINE HYDROCHLORIDE 180 MG/1
1 TABLET, FILM COATED ORAL
COMMUNITY
Start: 2024-10-28

## 2024-11-19 ASSESSMENT — ENCOUNTER SYMPTOMS
ENDOCRINE NEGATIVE: 0
EYES NEGATIVE: 0
HEMATOLOGIC/LYMPHATIC NEGATIVE: 0
PSYCHIATRIC NEGATIVE: 0
CARDIOVASCULAR NEGATIVE: 0
NEUROLOGICAL NEGATIVE: 0
ALLERGIC/IMMUNOLOGIC NEGATIVE: 0
MUSCULOSKELETAL NEGATIVE: 0
CONSTITUTIONAL NEGATIVE: 0
RESPIRATORY NEGATIVE: 0
GASTROINTESTINAL NEGATIVE: 0

## 2024-11-19 ASSESSMENT — KERATOMETRY
OD_K2POWER_DIOPTERS: 44.50
OD_AXISANGLE2_DEGREES: 165
OD_AXISANGLE_DEGREES: 75
METHOD_AUTO_MANUAL: AUTOMATED
OD_K1POWER_DIOPTERS: 43.25
OS_K2POWER_DIOPTERS: 45.00
OS_AXISANGLE_DEGREES: 100
OS_K1POWER_DIOPTERS: 43.25
OS_AXISANGLE2_DEGREES: 10

## 2024-11-19 ASSESSMENT — REFRACTION_MANIFEST
OD_CYLINDER: -0.75
OD_AXIS: 155
OD_SPHERE: -0.25
OD_SPHERE: -0.25
OD_AXIS: 160
OS_CYLINDER: -1.50
OS_ADD: +2.50
OS_AXIS: 020
OD_CYLINDER: -1.00
OD_ADD: +2.50
OS_CYLINDER: -0.75
OS_AXIS: 020
METHOD_AUTOREFRACTION: 1
OS_SPHERE: PLANO
OS_SPHERE: +0.25

## 2024-11-19 ASSESSMENT — SLIT LAMP EXAM - LIDS
COMMENTS: NORMAL
COMMENTS: NORMAL

## 2024-11-19 ASSESSMENT — REFRACTION_WEARINGRX
SPECS_TYPE: READERS
OS_SPHERE: +2.50
OD_SPHERE: +2.50

## 2024-11-19 ASSESSMENT — VISUAL ACUITY
OD_SC: 20/30
OS_SC+: +1
OS_SC: 20/30
METHOD: SNELLEN - SINGLE
OD_SC+: -2

## 2024-11-19 ASSESSMENT — PAIN SCALES - GENERAL: PAINLEVEL_OUTOF10: 0-NO PAIN

## 2024-11-19 ASSESSMENT — CUP TO DISC RATIO
OS_RATIO: 0.3
OD_RATIO: 0.3

## 2024-11-19 ASSESSMENT — PATIENT HEALTH QUESTIONNAIRE - PHQ9
1. LITTLE INTEREST OR PLEASURE IN DOING THINGS: NOT AT ALL
SUM OF ALL RESPONSES TO PHQ9 QUESTIONS 1 AND 2: 0
2. FEELING DOWN, DEPRESSED OR HOPELESS: NOT AT ALL

## 2024-11-19 ASSESSMENT — TONOMETRY
OS_IOP_MMHG: 19
IOP_METHOD: GOLDMANN APPLANATION
OD_IOP_MMHG: 20

## 2024-11-19 ASSESSMENT — EXTERNAL EXAM - LEFT EYE: OS_EXAM: NORMAL

## 2024-11-19 ASSESSMENT — EXTERNAL EXAM - RIGHT EYE: OD_EXAM: NORMAL

## 2024-11-19 NOTE — PATIENT INSTRUCTIONS
Thank you so much for choosing me to provide your care today!    If you were dilated your vision may remain blurry   or light sensitive for several hours.    The nature of eye and vision problems can require frequent follow up, please make every effort to adhere to any future appointments.    If you have any issues, questions, or concerns,   please do not hesitate to reach out.    If you receive a survey in regards to your care today, please mention any exceptional care my office staff and/or technicians provided.    You can reach our office at this number:    [phone]    Please consider signing up for and utilizing [employer]!  This is the best way to directly reach me or other  providers

## 2024-11-19 NOTE — ASSESSMENT & PLAN NOTE
Stable posterior changes right eye (OD), advised will continue to monitor with serial exam. Suspect more chronic than on age-related macular degeneration (AMD) spectrum but do have drusenoid consistency. Great BCVA regardless.

## 2024-11-19 NOTE — LETTER
November 19, 2024    Danish Koch DO  [address]  [address]  [facility] [address]    Patient: Serenity Russell   YOB: 1965   Date of Visit: 11/19/2024       Dear Dr. Danish Koch DO:    Thank you for referring Serenity Russell to me for evaluation. Here is my assessment and plan of care:    Assessment/Plan:  Serenity was seen today for annual exam and decreased visual acuity.  Diagnoses and all orders for this visit:  Type 2 diabetes mellitus without complication, without long-term current use of insulin (Multi) (Primary)  Bilateral pseudophakia  Macular degeneration of right eye, unspecified type  Refraction error    Right eye:     Left eye:    [x] No diabetes mellitus (DM) retinopathy [x] No diabetes mellitus (DM) retinopathy    [] Mild non-proliferative retinopathy [] Mild non-proliferative retinopathy    [] Moderate non-proliferative retinopathy [] Moderate non-proliferative retinopathy    [] Severe non-proliferative retinopathy [] Severe non-proliferative retinopathy    [] Proliferative retinopathy   [] Proliferative retinopathy    [] Diabetic macular edema   [] Diabetic macular edema    Urged patient to continue to work on best blood sugar and blood pressure control  Advised patient to call if any new vision changes noted    Will plan to repeat evaluation in:   [] 3 months [] 6 months [] 9 months [x] 12 months [] Other    Below you can find relevant pieces of the exam. If you have questions, please do not hesitate to call me. I look forward to following Serenity along with you.         Sincerely,        Presley Giron MD        CC:   No Recipients         External Exam         Right Left    External Normal Normal              Slit Lamp Exam         Right Left    Lids/Lashes Normal Normal    Conjunctiva/Sclera White and quiet White and quiet    Cornea incision site looks good incision site looks good    Anterior Chamber Inferior Vitreous strands Deep and quiet    Iris Round and  "reactive Round and reactive    Lens posterior chamber intraocular lens (IOL), sulcus placement with slight inferior nasal displacement Posterior chamber intraocular lens              Fundus Exam         Right Left    Vitreous Normal Normal    Disc Normal Normal    C/D Ratio 0.3 0.3    Macula drusenoid mottling Normal    Vessels Normal Normal    Periphery Normal Normal                   <div id=\"[email or web address]\"></div>     "

## 2024-11-19 NOTE — PROGRESS NOTES
Assessment/Plan   Problem List Items Addressed This Visit       Bilateral pseudophakia     Stable sulcus intraocular lens (IOL) right eye (OD) and posterior chamber intraocular lens (PCIOL) left eye (OS). Advised will continue to monitor with serial exam. Has few vitreous strands inferior right eye (OD) but no need for YAG at this point.          Macular degeneration     Stable posterior changes right eye (OD), advised will continue to monitor with serial exam. Suspect more chronic than on age-related macular degeneration (AMD) spectrum but do have drusenoid consistency. Great BCVA regardless.          Type 2 diabetes mellitus - Primary     Advised no signs of diabetic changes on exam. Recommend to continue best sugar control and on need for annual checkup. Understands role of good BP control and weight loss if applicable. Discussed some components of selected studies like WESDR, DCCT, and UKPDS to describe the likely course of diabetes and effects on the eyes.           Refraction error     Limited change from prior RX. Refraction performed today for diagnostic purposes only and without specific intent to dispense Rx. Glasses/SCL Rx not dispensed today.               Provided reassurance regarding above diagnoses and care received in the office visit today. Discussed outcomes and options along with the importance of treatment compliance. Understands the importance of any follow up visits. Patient instructed to call/communicate with our office if any new issues, questions, or concerns.     Will plan to see back in 1 year full or sooner PRN

## 2024-11-19 NOTE — ASSESSMENT & PLAN NOTE
Limited change from prior RX. Refraction performed today for diagnostic purposes only and without specific intent to dispense Rx. Glasses/SCL Rx not dispensed today.

## 2024-12-06 DIAGNOSIS — J30.9 ALLERGIC RHINITIS, UNSPECIFIED SEASONALITY, UNSPECIFIED TRIGGER: Primary | ICD-10-CM

## 2024-12-10 ENCOUNTER — OFFICE VISIT (OUTPATIENT)
Dept: PRIMARY CARE | Facility: CLINIC | Age: 59
End: 2024-12-10
Payer: COMMERCIAL

## 2024-12-10 VITALS
DIASTOLIC BLOOD PRESSURE: 74 MMHG | BODY MASS INDEX: 35.14 KG/M2 | WEIGHT: 198.4 LBS | RESPIRATION RATE: 18 BRPM | HEART RATE: 81 BPM | OXYGEN SATURATION: 99 % | TEMPERATURE: 98.5 F | SYSTOLIC BLOOD PRESSURE: 118 MMHG

## 2024-12-10 DIAGNOSIS — E11.9 TYPE 2 DIABETES MELLITUS WITHOUT COMPLICATION, WITHOUT LONG-TERM CURRENT USE OF INSULIN (MULTI): ICD-10-CM

## 2024-12-10 DIAGNOSIS — Z12.31 BREAST CANCER SCREENING BY MAMMOGRAM: ICD-10-CM

## 2024-12-10 LAB — POC HEMOGLOBIN A1C: 6.8 % (ref 4.2–6.5)

## 2024-12-10 PROCEDURE — 99213 OFFICE O/P EST LOW 20 MIN: CPT | Performed by: FAMILY MEDICINE

## 2024-12-10 PROCEDURE — 3048F LDL-C <100 MG/DL: CPT | Performed by: FAMILY MEDICINE

## 2024-12-10 PROCEDURE — 3074F SYST BP LT 130 MM HG: CPT | Performed by: FAMILY MEDICINE

## 2024-12-10 PROCEDURE — 3078F DIAST BP <80 MM HG: CPT | Performed by: FAMILY MEDICINE

## 2024-12-10 PROCEDURE — 83036 HEMOGLOBIN GLYCOSYLATED A1C: CPT | Performed by: FAMILY MEDICINE

## 2024-12-10 PROCEDURE — 3062F POS MACROALBUMINURIA REV: CPT | Performed by: FAMILY MEDICINE

## 2024-12-10 PROCEDURE — 4010F ACE/ARB THERAPY RXD/TAKEN: CPT | Performed by: FAMILY MEDICINE

## 2024-12-10 ASSESSMENT — ENCOUNTER SYMPTOMS
LOSS OF SENSATION IN FEET: 0
DEPRESSION: 0
OCCASIONAL FEELINGS OF UNSTEADINESS: 0

## 2024-12-10 ASSESSMENT — PAIN SCALES - GENERAL: PAINLEVEL_OUTOF10: 0-NO PAIN

## 2024-12-10 NOTE — PROGRESS NOTES
Subjective   Patient ID: Serenity Russell is a 59 y.o. female who presents for Diabetes.    Diabetes     a1cs 6.4-6.9-6.8 today  On full dose trulicity, jardiance 25.   Last eye exam a couple wks ago wnl.  Urine microalbumin wnl recently.    Review of Systems    Objective   /74   Pulse 81   Temp 36.9 °C (98.5 °F)   Resp 18   Wt 90 kg (198 lb 6.4 oz)   SpO2 99%   BMI 35.14 kg/m²     Physical Exam  Constitutional:       General: She is not in acute distress.     Appearance: Normal appearance.   Cardiovascular:      Rate and Rhythm: Normal rate and regular rhythm.      Heart sounds: No murmur heard.  Pulmonary:      Breath sounds: Normal breath sounds. No wheezing.   Neurological:      Mental Status: She is alert.         Assessment/Plan   Problem List Items Addressed This Visit             ICD-10-CM    Type 2 diabetes mellitus E11.9    Relevant Orders    POCT glycosylated hemoglobin (Hb A1C) manually resulted (Completed)     Other Visit Diagnoses         Codes    Breast cancer screening by mammogram     Z12.31    Relevant Orders    BI mammo bilateral screening tomosynthesis        She will continue current meds for now and work on good diet and wt loss.  Recheck 3 months.

## 2024-12-15 DIAGNOSIS — F33.1 MODERATE EPISODE OF RECURRENT MAJOR DEPRESSIVE DISORDER: ICD-10-CM

## 2024-12-16 RX ORDER — BREXPIPRAZOLE 0.25 MG/1
1 TABLET ORAL DAILY
Qty: 30 TABLET | Refills: 0 | Status: SHIPPED | OUTPATIENT
Start: 2024-12-16

## 2024-12-18 ENCOUNTER — TELEMEDICINE (OUTPATIENT)
Dept: PRIMARY CARE | Facility: CLINIC | Age: 59
End: 2024-12-18
Payer: COMMERCIAL

## 2024-12-18 DIAGNOSIS — J06.9 UPPER RESPIRATORY TRACT INFECTION, UNSPECIFIED TYPE: Primary | ICD-10-CM

## 2024-12-18 PROCEDURE — 99213 OFFICE O/P EST LOW 20 MIN: CPT | Performed by: REGISTERED NURSE

## 2024-12-18 PROCEDURE — 3048F LDL-C <100 MG/DL: CPT | Performed by: REGISTERED NURSE

## 2024-12-18 PROCEDURE — 4010F ACE/ARB THERAPY RXD/TAKEN: CPT | Performed by: REGISTERED NURSE

## 2024-12-18 PROCEDURE — 1036F TOBACCO NON-USER: CPT | Performed by: REGISTERED NURSE

## 2024-12-18 PROCEDURE — 3062F POS MACROALBUMINURIA REV: CPT | Performed by: REGISTERED NURSE

## 2024-12-18 RX ORDER — AZITHROMYCIN 250 MG/1
TABLET, FILM COATED ORAL
Qty: 6 TABLET | Refills: 0 | Status: SHIPPED | OUTPATIENT
Start: 2024-12-18 | End: 2024-12-23

## 2024-12-18 RX ORDER — ONDANSETRON 8 MG/1
8 TABLET, ORALLY DISINTEGRATING ORAL EVERY 8 HOURS PRN
Qty: 21 TABLET | Refills: 0 | Status: SHIPPED | OUTPATIENT
Start: 2024-12-18 | End: 2024-12-25

## 2024-12-18 ASSESSMENT — ENCOUNTER SYMPTOMS
OCCASIONAL FEELINGS OF UNSTEADINESS: 0
DEPRESSION: 0
COUGH: 1
SORE THROAT: 1
LOSS OF SENSATION IN FEET: 0

## 2024-12-18 NOTE — PROGRESS NOTES
Subjective   Patient ID: Serenity Russell is a 59 y.o. female who presents for URI (CALL   [phone]    PATIENT COMPLAINS OF COUGH, CONGESTION , EAR PAIN AND SORE THROAT X 4 DAYS).    URI   Associated symptoms include coughing, ear pain and a sore throat.      Pt reports s/s of URI  Review of Systems   HENT:  Positive for ear pain and sore throat.    Respiratory:  Positive for cough.    All other systems reviewed and are negative.      Objective   There were no vitals taken for this visit.    Physical Exam  Pulmonary:      Effort: Pulmonary effort is normal.   Neurological:      Mental Status: She is alert.   Psychiatric:         Mood and Affect: Mood normal.       This visit was performed in using real-time telehealth tools, including an audio/visual connection between Serenity Russell and their home and MART[facility] Regalado, with [facility].  Verbal consent was obtained from the patient to initiate this visit.   Assessment/Plan   Problem List Items Addressed This Visit    None  Visit Diagnoses         Codes    Upper respiratory tract infection, unspecified type    -  Primary J06.9    Relevant Medications    azithromycin (Zithromax) 250 mg tablet    dextromethorphan-guaifenesin (Mucinex DM)  mg 12 hr tablet    ondansetron ODT (Zofran-ODT) 8 mg disintegrating tablet

## 2024-12-19 ENCOUNTER — APPOINTMENT (OUTPATIENT)
Dept: RADIOLOGY | Facility: HOSPITAL | Age: 59
End: 2024-12-19
Payer: COMMERCIAL

## 2024-12-20 ENCOUNTER — TELEPHONE (OUTPATIENT)
Dept: PRIMARY CARE | Facility: CLINIC | Age: 59
End: 2024-12-20
Payer: COMMERCIAL

## 2024-12-20 DIAGNOSIS — R09.81 SINUS CONGESTION: Primary | ICD-10-CM

## 2024-12-24 ENCOUNTER — APPOINTMENT (OUTPATIENT)
Dept: BEHAVIORAL HEALTH | Facility: CLINIC | Age: 59
End: 2024-12-24
Payer: COMMERCIAL

## 2024-12-24 DIAGNOSIS — F33.1 MODERATE EPISODE OF RECURRENT MAJOR DEPRESSIVE DISORDER: ICD-10-CM

## 2024-12-24 DIAGNOSIS — F41.1 GAD (GENERALIZED ANXIETY DISORDER): ICD-10-CM

## 2024-12-24 DIAGNOSIS — F43.10 PTSD (POST-TRAUMATIC STRESS DISORDER): ICD-10-CM

## 2024-12-24 PROCEDURE — 3048F LDL-C <100 MG/DL: CPT | Performed by: PSYCHIATRY & NEUROLOGY

## 2024-12-24 PROCEDURE — 99213 OFFICE O/P EST LOW 20 MIN: CPT | Performed by: PSYCHIATRY & NEUROLOGY

## 2024-12-24 PROCEDURE — 1036F TOBACCO NON-USER: CPT | Performed by: PSYCHIATRY & NEUROLOGY

## 2024-12-24 PROCEDURE — 4010F ACE/ARB THERAPY RXD/TAKEN: CPT | Performed by: PSYCHIATRY & NEUROLOGY

## 2024-12-24 PROCEDURE — 3062F POS MACROALBUMINURIA REV: CPT | Performed by: PSYCHIATRY & NEUROLOGY

## 2024-12-24 RX ORDER — BREXPIPRAZOLE 0.25 MG/1
1 TABLET ORAL DAILY
Qty: 90 TABLET | Refills: 0 | Status: SHIPPED | OUTPATIENT
Start: 2024-12-24 | End: 2025-03-24

## 2024-12-24 RX ORDER — BUPROPION HYDROCHLORIDE 150 MG/1
150 TABLET ORAL DAILY
Qty: 90 TABLET | Refills: 0 | Status: SHIPPED | OUTPATIENT
Start: 2024-12-24 | End: 2025-03-24

## 2024-12-24 RX ORDER — PAROXETINE HYDROCHLORIDE HEMIHYDRATE 25 MG/1
50 TABLET, FILM COATED, EXTENDED RELEASE ORAL EVERY MORNING
Qty: 180 TABLET | Refills: 0 | Status: SHIPPED | OUTPATIENT
Start: 2024-12-24 | End: 2025-03-24

## 2024-12-24 NOTE — PROGRESS NOTES
"Adult Ambulatory Psychiatry Progress Note    Pt is at work  Writer is at [employer]    Virtual or Telephone Consent    An interactive audio and video telecommunication system which permits real time communications between the patient (at the originating site) and provider (at the distant site) was utilized to provide this telehealth service.   Verbal consent was requested and obtained from Serenity Russell on this date, 12/24/24 for a telehealth visit.      Assessment/Plan     Impression:  Serenity Russell is a 59 y.o. female domiciled with fiance and his son, employed who presents for follow up with CC of Depression, Anxiety, and Insomnia.          Plan:   Anxiety/depression/ PTSD - paroxetine CR 50mg daily, wellbutrin XL 150mg daily, continue rexulti 0.25mg daily, c/w med ed, psycho ed and supportive psychotherapy, f/u 3 months  Insomnia - pt uses medical marijuana gummies, trazodone 100mg qhs prn, exercise and diet regimen      Subjective   HPI:  Pt arrived on time. She was able to start the rexulti. Denies significant SE. Mood \"better\" since last session. She's feeling less depressed. Anxiety is still high but not as much as before. She's tolerating the [employer] shopping rush. Sleep is still interrupted even with trazodone and thc gummies. Discussed exercise routine. She tries to but plans to increase it in January. Appetite ok. Taking medicine as prescribed.           Objective   Mental Status Exam:  General Appearance: Well groomed, appropriate eye contact  Attitude/Behavior: Cooperative  Motor: No psychomotor agitation or retardation, no tremor or other abnormal movements  Speech: Normal rate, volume, prosody  Mood: \"better\"  Affect: Euthymic, full-range  Thought Process: Linear, goal directed  Thought Associations: No loosening of associations  Thought Content: Normal  Perception: No perceptual abnormalities noted  Insight: Intact  Judgement: Intact      Vitals:  There were no vitals filed for this " visit.    Current Medications:  Current Outpatient Medications on File Prior to Visit   Medication Sig Dispense Refill    Allergy Relief, fexofenadine, 180 mg tablet Take 1 tablet (180 mg) by mouth early in the morning..      atorvastatin (Lipitor) 10 mg tablet Take 1 tablet (10 mg) by mouth once daily. 100 tablet 3    [] azithromycin (Zithromax) 250 mg tablet Take 2 tablets (500 mg) by mouth once daily for 1 day, THEN 1 tablet (250 mg) once daily for 4 days. 6 tablet 0    cetirizine (ZyrTEC) 10 mg tablet Take 1 tablet (10 mg) by mouth once daily as needed.      dextromethorphan-guaifenesin (Mucinex DM)  mg 12 hr tablet Take 1 tablet by mouth every 12 hours for 10 days. Do not crush, chew, or split. 20 tablet 0    dulaglutide (Trulicity) 4.5 mg/0.5 mL pen injector Inject 4.5 mg under the skin 1 (one) time per week. 2 mL 11    empagliflozin (Jardiance) 25 mg Take 1 tablet (25 mg) by mouth once daily. 90 tablet 1    EPINEPHrine 0.3 mg/0.3 mL injection syringe Inject 0.3 mL (0.3 mg) into the muscle 1 time if needed.      fluticasone (Flonase) 50 mcg/actuation nasal spray Administer 1 spray into each nostril once daily.      levothyroxine (Synthroid, Levoxyl) 100 mcg tablet TAKE ONE TABLET BY MOUTH IN THE MORNING ON AN EMPTY STOMACH 90 tablet 3    lisinopril 2.5 mg tablet TAKE ONE TABLET BY MOUTH ONCE A DAY 90 tablet 1    nortriptyline (Pamelor) 10 mg capsule Take 1 capsule (10 mg) by mouth once daily at bedtime.      ondansetron (Zofran) 4 mg tablet TAKE ONE (1) TABLET BY MOUTH EVERY 8 HOURS IF NEEDED FOR NAUSEA OR VOMITING FOR UP TO 7 DAYS 20 tablet 0    ondansetron ODT (Zofran-ODT) 8 mg disintegrating tablet Dissolve 1 tablet (8 mg) in the mouth every 8 hours if needed for nausea or vomiting for up to 7 days. 21 tablet 0    Pataday Twice Daily Relief 0.1 % ophthalmic solution INSTILL ONE DROP INTO EACH EYE TWICE DAILY AS NEEDED FOR ITCHY EYES      traZODone (Desyrel) 100 mg tablet Take 1 tablet (100 mg)  by mouth once daily at bedtime. 90 tablet 0    [DISCONTINUED] buPROPion XL (Wellbutrin XL) 150 mg 24 hr tablet Take 1 tablet (150 mg) by mouth once daily. do not crush chew or split 90 tablet 0    [DISCONTINUED] PARoxetine CR (Paxil CR) 25 mg 24 hr tablet Take 2 tablets (50 mg) by mouth once daily in the morning. Do not crush, chew, or split. 180 tablet 0    [DISCONTINUED] Rexulti 0.25 mg tablet TAKE ONE TABLET BY MOUTH DAILY 30 tablet 0     No current facility-administered medications on file prior to visit.       Lab Review:   Office Visit on 12/10/2024   Component Date Value    POC HEMOGLOBIN A1c 12/10/2024 6.8 (A)        Orders:  Diagnoses and all orders for this visit:  Moderate episode of recurrent major depressive disorder  -     Follow Up In Psychiatry  -     brexpiprazole (Rexulti) 0.25 mg tablet; Take 1 tablet by mouth once daily.  -     PARoxetine CR (Paxil CR) 25 mg 24 hr tablet; Take 2 tablets (50 mg) by mouth once daily in the morning. Do not crush, chew, or split.  -     buPROPion XL (Wellbutrin XL) 150 mg 24 hr tablet; Take 1 tablet (150 mg) by mouth once daily. do not crush chew or split  -     Follow Up In Psychiatry; Future  JUDITH (generalized anxiety disorder)  -     PARoxetine CR (Paxil CR) 25 mg 24 hr tablet; Take 2 tablets (50 mg) by mouth once daily in the morning. Do not crush, chew, or split.  PTSD (post-traumatic stress disorder)  -     PARoxetine CR (Paxil CR) 25 mg 24 hr tablet; Take 2 tablets (50 mg) by mouth once daily in the morning. Do not crush, chew, or split.        Next Appointment:  Follow up in 13 weeks (on 3/25/2025).

## 2024-12-26 ENCOUNTER — LAB (OUTPATIENT)
Dept: LAB | Facility: LAB | Age: 59
End: 2024-12-26
Payer: COMMERCIAL

## 2024-12-26 ENCOUNTER — HOSPITAL ENCOUNTER (OUTPATIENT)
Dept: RADIOLOGY | Facility: HOSPITAL | Age: 59
Discharge: HOME | End: 2024-12-26
Payer: COMMERCIAL

## 2024-12-26 VITALS — BODY MASS INDEX: 33.66 KG/M2 | WEIGHT: 190 LBS | HEIGHT: 63 IN

## 2024-12-26 DIAGNOSIS — Z12.31 BREAST CANCER SCREENING BY MAMMOGRAM: ICD-10-CM

## 2024-12-26 DIAGNOSIS — J30.9 ALLERGIC RHINITIS, UNSPECIFIED SEASONALITY, UNSPECIFIED TRIGGER: ICD-10-CM

## 2024-12-26 PROCEDURE — 77067 SCR MAMMO BI INCL CAD: CPT | Performed by: RADIOLOGY

## 2024-12-26 PROCEDURE — 86003 ALLG SPEC IGE CRUDE XTRC EA: CPT

## 2024-12-26 PROCEDURE — 77067 SCR MAMMO BI INCL CAD: CPT

## 2024-12-26 PROCEDURE — 77063 BREAST TOMOSYNTHESIS BI: CPT | Performed by: RADIOLOGY

## 2024-12-26 PROCEDURE — 82785 ASSAY OF IGE: CPT

## 2024-12-28 LAB
A ALTERNATA IGE QN: <0.1 KU/L
A FUMIGATUS IGE QN: <0.1 KU/L
BERMUDA GRASS IGE QN: <0.1 KU/L
BOXELDER IGE QN: <0.1 KU/L
C HERBARUM IGE QN: <0.1 KU/L
CALIF WALNUT POLN IGE QN: <0.1 KU/L
CAT DANDER IGE QN: <0.1 KU/L
CLAM IGE QN: 1.52 KU/L
CMN PIGWEED IGE QN: <0.1 KU/L
CODFISH IGE QN: <0.1 KU/L
COMMON RAGWEED IGE QN: <0.1 KU/L
CORN IGE QN: <0.1
COTTONWOOD IGE QN: <0.1 KU/L
D FARINAE IGE QN: <0.1 KU/L
D PTERONYSS IGE QN: <0.1 KU/L
DOG DANDER IGE QN: <0.1 KU/L
EGG WHITE IGE QN: <0.1 KU/L
ENGL PLANTAIN IGE QN: <0.1 KU/L
GOOSEFOOT IGE QN: <0.1 KU/L
JOHNSON GRASS IGE QN: <0.1 KU/L
KENT BLUE GRASS IGE QN: 0.12 KU/L
LONDON PLANE IGE QN: <0.1 KU/L
MILK IGE QN: <0.1 KU/L
MT JUNIPER IGE QN: <0.1 KU/L
P NOTATUM IGE QN: <0.1 KU/L
PEANUT IGE QN: <0.1 KU/L
PECAN/HICK TREE IGE QN: <0.1 KU/L
ROACH IGE QN: <0.1 KU/L
SALTWORT IGE QN: <0.1 KU/L
SCALLOP IGE QN: 0.2 KU/L
SESAME SEED IGE QN: <0.1 KU/L
SHEEP SORREL IGE QN: <0.1 KU/L
SHRIMP IGE QN: <0.1 KU/L
SILVER BIRCH IGE QN: <0.1 KU/L
SOYBEAN IGE QN: <0.1 KU/L
TIMOTHY IGE QN: 0.2 KU/L
TOTAL IGE SMQN RAST: 65 KU/L
WALNUT IGE QN: <0.1 KU/L
WHEAT IGE QN: <0.1 KU/L
WHITE ASH IGE QN: <0.1 KU/L
WHITE ELM IGE QN: <0.1 KU/L
WHITE MULBERRY IGE QN: <0.1 KU/L
WHITE OAK IGE QN: <0.1 KU/L

## 2024-12-29 DIAGNOSIS — E11.9 TYPE 2 DIABETES MELLITUS WITHOUT COMPLICATION, WITHOUT LONG-TERM CURRENT USE OF INSULIN (MULTI): ICD-10-CM

## 2024-12-29 DIAGNOSIS — R51.9 NONINTRACTABLE HEADACHE, UNSPECIFIED CHRONICITY PATTERN, UNSPECIFIED HEADACHE TYPE: Primary | ICD-10-CM

## 2024-12-30 RX ORDER — LISINOPRIL 2.5 MG/1
2.5 TABLET ORAL DAILY
Qty: 90 TABLET | Refills: 3 | Status: SHIPPED | OUTPATIENT
Start: 2024-12-30

## 2024-12-31 RX ORDER — AMOXICILLIN AND CLAVULANATE POTASSIUM 875; 125 MG/1; MG/1
875 TABLET, FILM COATED ORAL 2 TIMES DAILY
Qty: 20 TABLET | Refills: 0 | Status: SHIPPED | OUTPATIENT
Start: 2024-12-31 | End: 2025-01-10

## 2025-01-02 ENCOUNTER — APPOINTMENT (OUTPATIENT)
Dept: PRIMARY CARE | Facility: CLINIC | Age: 60
End: 2025-01-02
Payer: COMMERCIAL

## 2025-01-08 RX ORDER — NORTRIPTYLINE HYDROCHLORIDE 10 MG/1
10 CAPSULE ORAL NIGHTLY
Qty: 30 CAPSULE | Refills: 0 | Status: SHIPPED | OUTPATIENT
Start: 2025-01-08

## 2025-01-12 DIAGNOSIS — G47.00 INSOMNIA, UNSPECIFIED TYPE: ICD-10-CM

## 2025-01-13 RX ORDER — TRAZODONE HYDROCHLORIDE 100 MG/1
100 TABLET ORAL NIGHTLY
Qty: 90 TABLET | Refills: 0 | Status: SHIPPED | OUTPATIENT
Start: 2025-01-13

## 2025-01-14 ENCOUNTER — PATIENT MESSAGE (OUTPATIENT)
Dept: BEHAVIORAL HEALTH | Facility: CLINIC | Age: 60
End: 2025-01-14
Payer: COMMERCIAL

## 2025-01-14 DIAGNOSIS — F33.1 MODERATE EPISODE OF RECURRENT MAJOR DEPRESSIVE DISORDER: ICD-10-CM

## 2025-01-15 NOTE — PATIENT COMMUNICATION
Called pt. She denies SI, plan or intent but feels like she might die which is why she was filling out forms to talk to a  home. Her mood worsened . Discussed increasing rexulti to 0.5mg and to contact writer on Monday with update. Historically pt has communicated reliably via [employer]. Pt was educated that if they feel a danger to themselves or others to go to the nearest emergency room or call [phone].

## 2025-01-31 ENCOUNTER — LAB (OUTPATIENT)
Dept: LAB | Facility: HOSPITAL | Age: 60
End: 2025-01-31
Payer: COMMERCIAL

## 2025-01-31 DIAGNOSIS — C56.9 MALIGNANT NEOPLASM OF OVARY, UNSPECIFIED LATERALITY (MULTI): ICD-10-CM

## 2025-01-31 LAB — CANCER AG125 SERPL-ACNC: 10 U/ML (ref 0–30.2)

## 2025-01-31 PROCEDURE — 86304 IMMUNOASSAY TUMOR CA 125: CPT

## 2025-02-10 NOTE — PROGRESS NOTES
Patient ID: Serenity Russell is a 59 y.o. female.  Referring Physician: No referring provider defined for this encounter.  Primary Care Provider: Danish Koch DO    Subjective    HPI    HPI: 55 year old G0 here today s/p debulking surgery for grade 1, stage IC endometrioid OVARIAN cancer.     Tumor history:  -Patient reports being diagnosed with left lower extremity DVT on 6/29/17. Patient reports DVT was confirmed with ultrasound. Patient currently being managed by Dr. Giles with daily Lovenox.    - (7/17/17):892.7    -Patient returned for admission 9/19-9/22 with likely lymphocele. Drain placed. No evidence of urinoma.   -CT (10/27/17): done for less output form the drain.  2 of three collections smaller.    -CA-125 (10/26/17): 11.7  -Hypersensitivity reaction with cycle #2.   -11/13/17  - 12.7   -CA-125 (11/13/17):12.7  -12/5/17 - IVC filter removed, right subclavian Mediport placed  -12/6/17 - transfused 2 units PRBCs   -1/4/18  11.3  -1/22/18  - 15  -1/29/18: last cycle (#6) of carbo/taxol  -Patient s/p incision & debridement of abdomen, mons pubis and right vulva on 2/10/18 for management of necrotizing fasciitis. Patient shared she is slowly regaining her strength. Patient reports VAC was applied on 3/23/18 with home care nurse recently  sharing that the wound has shown dramatic wound healing with VAC.  -Genetic testing negative.      Interval History: Serenity is a 58 year female with history of grade 1, stage IC endometrioid ovarian cancer. Patient with remote history of hysterectomy in 2011 for dysfunctional bleeding. She underwent exploratory laparotomy, bilateral salpingo oophorectomy, enterolysis, lysis of adhesions, bilateral pelvic lymph node dissection, omentectomy, peritoneal biopsies, omental biopsy, cystoscopy on 8/22/17. She completed 6 cycles of carbo/taxol. Treatment complicated by necrotizing fasciitis, s/p debridement of abdomen, vulva, and mons pubis on 2/10/18.  "CA-125 is pending. Patient underwent ex lap, MARIO, retrorectus incisional hernia repair with mesh on  with Dr. Garcia. Underwent endoscopy and HIDA scan recently, which were normal. No energy changes. No VB or discharge. She denies fevers, chills, chest pain, shortness of breath, nausea, vomiting. Has been sexually actives and reports dyspareunia related to dryness.    PMH: morbid obesity, depression, DVT, ovarian cancer, necrotizing fascitis, DMII, hypothyroidism.      Past Surgical History: left ankle fracture with internal fixation (), pins removed (); hysterectomy ( for dysfunctional bleeding), Exploratory laparotomy, bilateral salpingo oophorectomy, enterolysis, lysis of adhesions, bilateral pelvic lymph  node dissection, omentectomy, peritoneal biopsies, omental biopsy, cystoscopy on 17. 2/10/18 Debridement of her abdomen, vulva, and mons.          Family History: mother - AML (85), breast (84); father - bladder (57), maternal grandmother with breast cancer and  at a young age.      Social History: Patient reports being a former cigarette smoker. Patient describes occasional alcohol use, denies recreational drug use.       OB/GYN History: Patient is G0. Patient is unaware of when entered menopause due to hysterectomy, HRT since . Patient denies history of abnormal pap smears.      Screening:    Mammogram: 2018: neg     Colonoscopy: 2000 - polyps per patient      Refer: Dmitri Sahni MD  PCP: Danish Koch MD     Objective    BSA: 1.99 meters squared  /77 (BP Location: Left arm, Patient Position: Sitting, BP Cuff Size: Large adult)   Pulse 78   Temp 36.3 °C (97.3 °F) (Temporal)   Resp 18   Ht (S) 1.59 m (5' 2.6\")   Wt 89.7 kg (197 lb 12 oz)   SpO2 97%   BMI 35.48 kg/m²      Physical Exam  Vitals and nursing note reviewed.   Constitutional:       Appearance: Normal appearance. She is normal weight.   HENT:      Mouth/Throat:      Mouth: Mucous membranes are moist.      " Pharynx: Oropharynx is clear.   Eyes:      Conjunctiva/sclera: Conjunctivae normal.      Pupils: Pupils are equal, round, and reactive to light.   Cardiovascular:      Rate and Rhythm: Normal rate and regular rhythm.   Pulmonary:      Effort: Pulmonary effort is normal.      Breath sounds: Normal breath sounds.   Abdominal:      General: Abdomen is flat. There is no distension.      Palpations: Abdomen is soft. There is no mass.      Tenderness: There is no abdominal tenderness.   Genitourinary:     General: Normal vulva.      Vagina: Normal.      Uterus: Absent.       Rectum: Normal.      Comments: Normal external female genitalia beyond  the healed area on the right. Mons pubis with healed incision. Urethral meatus normal. Vagina normal.  Uterus and cervix surgically absent. No evidence of masses in pelvis or pain with palpation. No vaginal bleeding.  Musculoskeletal:         General: Normal range of motion.   Skin:     General: Skin is warm and dry.   Neurological:      Mental Status: She is alert.   Psychiatric:         Mood and Affect: Mood normal.         Behavior: Behavior normal.       Performance Status:  Asymptomatic    Assessment/Plan     Serenity is a 59 year female with history of grade 1, stage IC endometrioid ovarian cancer. Patient with remote history of hysterectomy in 2011 for dysfunctional bleeding.  She underwent exploratory laparotomy, bilateral salpingo oophorectomy, enterolysis, lysis of adhesions, bilateral pelvic lymph node dissection, omentectomy, peritoneal biopsies, omental biopsy, cystoscopy on 8/22/17. She completed 6 cycles of carbo/taxol.  Treatment complicated by necrotizing fasciitis, s/p debridement of abdomen, vulva, and mons pubis on 2/10/18. CA-125 will be drawn tomorrow.    Plan:    Physical examination was within normal limits today.  She is currently IJOEMA.  We reviewed signs and symptoms of possible recurrence with the patient and she will call our office should she experience  any of these.     reviewed and is normal     No longer taking gabapentin for neuropathy    Will start Imvexxy 4mcg for vaginal dryness and dyspareunia     Phone visit in 3 months to assess symptoms     Follow up in 1 year or sooner if needed for cancer surveillance visit

## 2025-02-11 ENCOUNTER — OFFICE VISIT (OUTPATIENT)
Dept: GYNECOLOGIC ONCOLOGY | Facility: CLINIC | Age: 60
End: 2025-02-11
Payer: COMMERCIAL

## 2025-02-11 VITALS
BODY MASS INDEX: 35.04 KG/M2 | SYSTOLIC BLOOD PRESSURE: 114 MMHG | RESPIRATION RATE: 18 BRPM | HEIGHT: 63 IN | TEMPERATURE: 97.3 F | WEIGHT: 197.75 LBS | OXYGEN SATURATION: 97 % | DIASTOLIC BLOOD PRESSURE: 77 MMHG | HEART RATE: 78 BPM

## 2025-02-11 DIAGNOSIS — C56.9 MALIGNANT NEOPLASM OF OVARY, UNSPECIFIED LATERALITY (MULTI): Primary | ICD-10-CM

## 2025-02-11 DIAGNOSIS — N95.1 VAGINAL DRYNESS, MENOPAUSAL: ICD-10-CM

## 2025-02-11 PROCEDURE — 3008F BODY MASS INDEX DOCD: CPT | Performed by: NURSE PRACTITIONER

## 2025-02-11 PROCEDURE — 99214 OFFICE O/P EST MOD 30 MIN: CPT | Performed by: NURSE PRACTITIONER

## 2025-02-11 PROCEDURE — 4010F ACE/ARB THERAPY RXD/TAKEN: CPT | Performed by: NURSE PRACTITIONER

## 2025-02-11 PROCEDURE — 3074F SYST BP LT 130 MM HG: CPT | Performed by: NURSE PRACTITIONER

## 2025-02-11 PROCEDURE — 3078F DIAST BP <80 MM HG: CPT | Performed by: NURSE PRACTITIONER

## 2025-02-11 PROCEDURE — 1036F TOBACCO NON-USER: CPT | Performed by: NURSE PRACTITIONER

## 2025-02-11 RX ORDER — ESTRADIOL 4 UG/1
4 INSERT VAGINAL DAILY
Qty: 18 EACH | Refills: 0 | Status: SHIPPED | OUTPATIENT
Start: 2025-02-11 | End: 2025-02-12

## 2025-02-11 RX ORDER — ESTRADIOL 4 UG/1
4 INSERT VAGINAL 2 TIMES WEEKLY
Qty: 8 EACH | Refills: 11 | Status: SHIPPED | OUTPATIENT
Start: 2025-03-12 | End: 2025-02-12

## 2025-02-11 SDOH — ECONOMIC STABILITY: FOOD INSECURITY: WITHIN THE PAST 12 MONTHS, YOU WORRIED THAT YOUR FOOD WOULD RUN OUT BEFORE YOU GOT THE MONEY TO BUY MORE.: NEVER TRUE

## 2025-02-11 SDOH — ECONOMIC STABILITY: FOOD INSECURITY: WITHIN THE PAST 12 MONTHS, THE FOOD YOU BOUGHT JUST DIDN'T LAST AND YOU DIDN'T HAVE MONEY TO GET MORE.: NEVER TRUE

## 2025-02-11 ASSESSMENT — PAIN SCALES - GENERAL: PAINLEVEL_OUTOF10: 0-NO PAIN

## 2025-02-12 RX ORDER — ESTRADIOL 0.1 MG/G
2 CREAM VAGINAL DAILY
Qty: 42.5 G | Refills: 12 | Status: SHIPPED | OUTPATIENT
Start: 2025-02-12 | End: 2026-02-12

## 2025-02-17 ENCOUNTER — TELEMEDICINE (OUTPATIENT)
Dept: PRIMARY CARE | Facility: CLINIC | Age: 60
End: 2025-02-17
Payer: COMMERCIAL

## 2025-02-17 DIAGNOSIS — U07.1 COVID: Primary | ICD-10-CM

## 2025-02-17 DIAGNOSIS — R19.7 DIARRHEA, UNSPECIFIED TYPE: ICD-10-CM

## 2025-02-17 DIAGNOSIS — R53.83 OTHER FATIGUE: ICD-10-CM

## 2025-02-17 RX ORDER — NIRMATRELVIR AND RITONAVIR 300-100 MG
3 KIT ORAL 2 TIMES DAILY
Qty: 30 TABLET | Refills: 0 | Status: SHIPPED | OUTPATIENT
Start: 2025-02-17 | End: 2025-02-22

## 2025-02-17 ASSESSMENT — LIFESTYLE VARIABLES: HOW OFTEN DO YOU HAVE A DRINK CONTAINING ALCOHOL: MONTHLY OR LESS

## 2025-02-17 NOTE — PROGRESS NOTES
Subjective   Patient ID: Serenity Russell is a 59 y.o. female who presents for covid positive (Pt tested positive for covid today. Pt c/o cough, joint/body weakness, fever, vomiting, diarrhea since yesterday).    TESTED POSITIVE FOR COVID THIS AM.HAS A FEVER CHILLS FATIGUE, NO ONE AT HOME SICK, WORKS FOR [employer],.NEEDS A  NOTE FAXED, WILLGET  US THE FAX NUMBER, DISCUSSED COVID PRECAUTIONS, DISCUSSED PAXLOVID.        Review of Systems    Objective   There were no vitals taken for this visit.    Physical Exam exam  no t dobne loos fatigued alert responsive, discussed symptoms n no one at hoe  sick, breathing normally in no distress. No cough audible.    Assessment/Plan   Problem List Items Addressed This Visit    None  Visit Diagnoses         Codes    COVID    -  Primary U07.1    Relevant Medications    nirmatrelvir-ritonavir (Paxlovid) 300 mg (150 mg x 2)-100 mg tablet therapy pack    Diarrhea, unspecified type     R19.7    Other fatigue     R53.83        Hold trazadone andlipitor clears  then braty diet. Call if needed

## 2025-02-17 NOTE — LETTER
February 18, 2025     Patient: Serenity Russell   YOB: 1965   Date of Visit: 2/17/2025       To Whom It May Concern:    Serenity Russell was seen in my clinic on 2/17/2025 at 11:50 am. Please excuse Serenity for her absence from work on this day to make the appointment. She may return to work on Monday, 2/24/2025.    If you have any questions or concerns, please don't hesitate to call.         Sincerely,         LYNDSEY Urias-CNP        CC: No Recipients

## 2025-02-21 ENCOUNTER — TELEPHONE (OUTPATIENT)
Dept: PRIMARY CARE | Facility: CLINIC | Age: 60
End: 2025-02-21
Payer: COMMERCIAL

## 2025-02-21 NOTE — TELEPHONE ENCOUNTER
Pt called and states she still has a lot of weakness and feels like cough is moving down to her chest. Has Covid. Pt tried to schedule appt today in office but schedule is full. Advised pt needs to be seen at urgent care or ER today

## 2025-02-24 ENCOUNTER — OFFICE VISIT (OUTPATIENT)
Dept: PRIMARY CARE | Facility: CLINIC | Age: 60
End: 2025-02-24
Payer: COMMERCIAL

## 2025-02-24 VITALS
SYSTOLIC BLOOD PRESSURE: 124 MMHG | HEIGHT: 63 IN | WEIGHT: 190 LBS | BODY MASS INDEX: 33.66 KG/M2 | HEART RATE: 87 BPM | TEMPERATURE: 96.3 F | OXYGEN SATURATION: 98 % | DIASTOLIC BLOOD PRESSURE: 80 MMHG

## 2025-02-24 DIAGNOSIS — U07.1 COVID: Primary | ICD-10-CM

## 2025-02-24 PROCEDURE — 3074F SYST BP LT 130 MM HG: CPT | Performed by: REGISTERED NURSE

## 2025-02-24 PROCEDURE — 99213 OFFICE O/P EST LOW 20 MIN: CPT | Performed by: REGISTERED NURSE

## 2025-02-24 PROCEDURE — 3008F BODY MASS INDEX DOCD: CPT | Performed by: REGISTERED NURSE

## 2025-02-24 PROCEDURE — 3079F DIAST BP 80-89 MM HG: CPT | Performed by: REGISTERED NURSE

## 2025-02-24 PROCEDURE — 1036F TOBACCO NON-USER: CPT | Performed by: REGISTERED NURSE

## 2025-02-24 PROCEDURE — 4010F ACE/ARB THERAPY RXD/TAKEN: CPT | Performed by: REGISTERED NURSE

## 2025-02-24 RX ORDER — BENZONATATE 200 MG/1
200 CAPSULE ORAL 3 TIMES DAILY PRN
Qty: 42 CAPSULE | Refills: 0 | Status: SHIPPED | OUTPATIENT
Start: 2025-02-24 | End: 2025-03-26

## 2025-02-24 ASSESSMENT — ENCOUNTER SYMPTOMS
COUGH: 1
FEVER: 1
SINUS PAIN: 1
LOSS OF SENSATION IN FEET: 0
RHINORRHEA: 1
SORE THROAT: 1
OCCASIONAL FEELINGS OF UNSTEADINESS: 0
SINUS PRESSURE: 1
FATIGUE: 1
DEPRESSION: 0

## 2025-02-24 ASSESSMENT — PAIN SCALES - GENERAL: PAINLEVEL_OUTOF10: 0-NO PAIN

## 2025-02-24 NOTE — PROGRESS NOTES
"Subjective   Patient ID: Serenity Russell is a 59 y.o. female who presents for Follow-up (COVID 7 Days ago, Pt states feels like its moved into her chest, has cough, right ear hurts, post nasal drip, causing sore throat and vomiting, still feels weak).    HPI     Review of Systems   Constitutional:  Positive for fatigue.   HENT:  Positive for ear pain, rhinorrhea, sinus pressure, sinus pain and sore throat.    Respiratory:  Positive for cough.    All other systems reviewed and are negative.    Pt here with S/S of COVID  Objective   /80 (BP Location: Right arm, Patient Position: Sitting, BP Cuff Size: Adult)   Pulse 87   Temp 35.7 °C (96.3 °F) (Temporal)   Ht 1.6 m (5' 3\")   Wt 86.2 kg (190 lb)   SpO2 98%   BMI 33.66 kg/m²     Physical Exam  Vitals reviewed.   Constitutional:       Appearance: Normal appearance.   HENT:      Right Ear: Ear canal and external ear normal.      Left Ear: Ear canal and external ear normal.      Nose: Rhinorrhea present.      Mouth/Throat:      Mouth: Mucous membranes are moist.      Pharynx: Posterior oropharyngeal erythema present.   Pulmonary:      Effort: Pulmonary effort is normal.      Breath sounds: Normal breath sounds.   Neurological:      Mental Status: She is alert.   Psychiatric:         Mood and Affect: Mood normal.         Behavior: Behavior normal.         Assessment/Plan   Problem List Items Addressed This Visit    None  Visit Diagnoses         Codes    COVID    -  Primary U07.1    Relevant Medications    benzonatate (Tessalon) 200 mg capsule               "

## 2025-02-24 NOTE — LETTER
February 24, 2025     Patient: Serenity Russell   YOB: 1965   Date of Visit: 2/24/2025       To Whom It May Concern:    Serenity Russell was seen in my clinic on 2/24/2025 at 8:40 am. Please excuse Serenity for her absence from work on this day to make the appointment.  Serenity can return to work 2/25/25.    If you have any questions or concerns, please don't hesitate to call.         Sincerely,         Shonda Faulkner, LYNDSEY-CNP        CC: No Recipients

## 2025-02-24 NOTE — PROGRESS NOTES
"Subjective   Patient ID: Serenity Russell is a 59 y.o. female who presents for Follow-up (COVID 7 Days ago, Pt states feels like its moved into her chest, has cough, right ear hurts, post nasal drip, causing sore throat and vomiting, still feels weak).    HPI Pt here for follow up for COVID    Review of Systems   Constitutional:  Positive for fatigue and fever.   HENT:  Positive for postnasal drip, sinus pressure, sinus pain and sore throat.    Respiratory:  Positive for cough.    All other systems reviewed and are negative.      Objective   /80 (BP Location: Right arm, Patient Position: Sitting, BP Cuff Size: Adult)   Pulse 87   Temp 35.7 °C (96.3 °F) (Temporal)   Ht 1.6 m (5' 3\")   Wt 86.2 kg (190 lb)   SpO2 98%   BMI 33.66 kg/m²     Physical Exam  Vitals reviewed.   Constitutional:       Appearance: Normal appearance.   HENT:      Right Ear: Ear canal and external ear normal.      Left Ear: Ear canal and external ear normal.      Nose: Congestion present.      Mouth/Throat:      Pharynx: Posterior oropharyngeal erythema present.   Pulmonary:      Effort: Pulmonary effort is normal.      Breath sounds: Normal breath sounds.   Neurological:      General: No focal deficit present.      Mental Status: She is alert and oriented to person, place, and time.       Assessment/Plan   Problem List Items Addressed This Visit    None         "

## 2025-02-26 ENCOUNTER — TELEPHONE (OUTPATIENT)
Dept: PRIMARY CARE | Facility: CLINIC | Age: 60
End: 2025-02-26
Payer: COMMERCIAL

## 2025-02-27 ENCOUNTER — TELEPHONE (OUTPATIENT)
Dept: PRIMARY CARE | Facility: CLINIC | Age: 60
End: 2025-02-27
Payer: COMMERCIAL

## 2025-03-13 ENCOUNTER — OFFICE VISIT (OUTPATIENT)
Dept: PRIMARY CARE | Facility: CLINIC | Age: 60
End: 2025-03-13
Payer: COMMERCIAL

## 2025-03-13 VITALS
HEIGHT: 63 IN | TEMPERATURE: 96.9 F | DIASTOLIC BLOOD PRESSURE: 68 MMHG | BODY MASS INDEX: 34.98 KG/M2 | SYSTOLIC BLOOD PRESSURE: 130 MMHG | HEART RATE: 106 BPM | RESPIRATION RATE: 16 BRPM | OXYGEN SATURATION: 99 % | WEIGHT: 197.4 LBS

## 2025-03-13 DIAGNOSIS — E11.9 TYPE 2 DIABETES MELLITUS WITHOUT COMPLICATION, WITHOUT LONG-TERM CURRENT USE OF INSULIN (MULTI): ICD-10-CM

## 2025-03-13 DIAGNOSIS — Z00.00 ROUTINE MEDICAL EXAM: Primary | ICD-10-CM

## 2025-03-13 LAB — POC HEMOGLOBIN A1C: 7.3 % (ref 4.2–6.5)

## 2025-03-13 PROCEDURE — 3075F SYST BP GE 130 - 139MM HG: CPT | Performed by: FAMILY MEDICINE

## 2025-03-13 PROCEDURE — 83036 HEMOGLOBIN GLYCOSYLATED A1C: CPT | Performed by: FAMILY MEDICINE

## 2025-03-13 PROCEDURE — 4010F ACE/ARB THERAPY RXD/TAKEN: CPT | Performed by: FAMILY MEDICINE

## 2025-03-13 PROCEDURE — 99213 OFFICE O/P EST LOW 20 MIN: CPT | Performed by: FAMILY MEDICINE

## 2025-03-13 PROCEDURE — 3008F BODY MASS INDEX DOCD: CPT | Performed by: FAMILY MEDICINE

## 2025-03-13 PROCEDURE — 3078F DIAST BP <80 MM HG: CPT | Performed by: FAMILY MEDICINE

## 2025-03-13 PROCEDURE — 99396 PREV VISIT EST AGE 40-64: CPT | Performed by: FAMILY MEDICINE

## 2025-03-13 ASSESSMENT — PAIN SCALES - GENERAL: PAINLEVEL_OUTOF10: 8

## 2025-03-13 NOTE — PROGRESS NOTES
"Subjective   Patient ID: Serenity Russell is a 59 y.o. female who presents for Annual Exam (Pt is here for pe and fbw. Pt was not told to fast so she would like everything drawn except lipids.).    HPI colonoscopy in 2023.    Mmamo done in dec 2024    Review of Systems    Objective   /68 (BP Location: Left arm, Patient Position: Sitting, BP Cuff Size: Adult)   Pulse 106   Temp 36.1 °C (96.9 °F) (Temporal)   Resp 16   Ht 1.6 m (5' 3\")   Wt 89.5 kg (197 lb 6.4 oz)   SpO2 99%   BMI 34.97 kg/m²     Physical Exam    Assessment/Plan   {Assess/PlanSmartLinks:84608}     Will keep meds the same for now and she will work on good diet and wt loss and recheck 3 months.   " General: No swelling or tenderness. Normal range of motion.      Cervical back: Normal range of motion and neck supple.   Lymphadenopathy:      Cervical: No cervical adenopathy.   Skin:     General: Skin is warm.      Findings: No rash.   Neurological:      General: No focal deficit present.      Mental Status: She is alert.         Assessment/Plan   Problem List Items Addressed This Visit             ICD-10-CM    Type 2 diabetes mellitus E11.9    Relevant Orders    POCT glycosylated hemoglobin (Hb A1C) manually resulted (Completed)     Other Visit Diagnoses         Codes    Routine medical exam    -  Primary Z00.00    Relevant Orders    CBC and Auto Differential (Completed)    Comprehensive Metabolic Panel (Completed)    TSH with reflex to Free T4 if abnormal (Completed)    CT cardiac scoring wo IV contrast             Will keep meds the same for now and she will work on good diet and wt loss and recheck 3 months.

## 2025-03-15 LAB
ALBUMIN SERPL-MCNC: 4.9 G/DL (ref 3.6–5.1)
ALP SERPL-CCNC: 69 U/L (ref 37–153)
ALT SERPL-CCNC: 37 U/L (ref 6–29)
ANION GAP SERPL CALCULATED.4IONS-SCNC: 14 MMOL/L (CALC) (ref 7–17)
AST SERPL-CCNC: 24 U/L (ref 10–35)
BASOPHILS # BLD AUTO: 30 CELLS/UL (ref 0–200)
BASOPHILS NFR BLD AUTO: 0.4 %
BILIRUB SERPL-MCNC: 0.3 MG/DL (ref 0.2–1.2)
BUN SERPL-MCNC: 24 MG/DL (ref 7–25)
CALCIUM SERPL-MCNC: 10 MG/DL (ref 8.6–10.4)
CHLORIDE SERPL-SCNC: 107 MMOL/L (ref 98–110)
CO2 SERPL-SCNC: 20 MMOL/L (ref 20–32)
CREAT SERPL-MCNC: 0.94 MG/DL (ref 0.5–1.03)
EGFRCR SERPLBLD CKD-EPI 2021: 71 ML/MIN/1.73M2
EOSINOPHIL # BLD AUTO: 89 CELLS/UL (ref 15–500)
EOSINOPHIL NFR BLD AUTO: 1.2 %
ERYTHROCYTE [DISTWIDTH] IN BLOOD BY AUTOMATED COUNT: 13 % (ref 11–15)
GLUCOSE SERPL-MCNC: 143 MG/DL (ref 65–99)
HCT VFR BLD AUTO: 46.5 % (ref 35–45)
HGB BLD-MCNC: 16 G/DL (ref 11.7–15.5)
LYMPHOCYTES # BLD AUTO: 2472 CELLS/UL (ref 850–3900)
LYMPHOCYTES NFR BLD AUTO: 33.4 %
MCH RBC QN AUTO: 30.9 PG (ref 27–33)
MCHC RBC AUTO-ENTMCNC: 34.4 G/DL (ref 32–36)
MCV RBC AUTO: 89.9 FL (ref 80–100)
MONOCYTES # BLD AUTO: 422 CELLS/UL (ref 200–950)
MONOCYTES NFR BLD AUTO: 5.7 %
NEUTROPHILS # BLD AUTO: 4388 CELLS/UL (ref 1500–7800)
NEUTROPHILS NFR BLD AUTO: 59.3 %
PLATELET # BLD AUTO: 211 THOUSAND/UL (ref 140–400)
PMV BLD REES-ECKER: 12.3 FL (ref 7.5–12.5)
POTASSIUM SERPL-SCNC: 3.8 MMOL/L (ref 3.5–5.3)
PROT SERPL-MCNC: 7.6 G/DL (ref 6.1–8.1)
RBC # BLD AUTO: 5.17 MILLION/UL (ref 3.8–5.1)
SODIUM SERPL-SCNC: 141 MMOL/L (ref 135–146)
TSH SERPL-ACNC: 1.37 MIU/L (ref 0.4–4.5)
WBC # BLD AUTO: 7.4 THOUSAND/UL (ref 3.8–10.8)

## 2025-03-17 ASSESSMENT — ENCOUNTER SYMPTOMS
GASTROINTESTINAL NEGATIVE: 1
CONSTITUTIONAL NEGATIVE: 1
NEUROLOGICAL NEGATIVE: 1
RESPIRATORY NEGATIVE: 1
MUSCULOSKELETAL NEGATIVE: 1
CARDIOVASCULAR NEGATIVE: 1

## 2025-03-18 ENCOUNTER — PATIENT MESSAGE (OUTPATIENT)
Dept: BEHAVIORAL HEALTH | Facility: CLINIC | Age: 60
End: 2025-03-18
Payer: COMMERCIAL

## 2025-03-18 DIAGNOSIS — F33.1 MODERATE EPISODE OF RECURRENT MAJOR DEPRESSIVE DISORDER: ICD-10-CM

## 2025-03-25 ENCOUNTER — APPOINTMENT (OUTPATIENT)
Dept: BEHAVIORAL HEALTH | Facility: CLINIC | Age: 60
End: 2025-03-25
Payer: COMMERCIAL

## 2025-03-25 DIAGNOSIS — F41.1 GAD (GENERALIZED ANXIETY DISORDER): ICD-10-CM

## 2025-03-25 DIAGNOSIS — F43.10 PTSD (POST-TRAUMATIC STRESS DISORDER): ICD-10-CM

## 2025-03-25 DIAGNOSIS — F33.1 MODERATE EPISODE OF RECURRENT MAJOR DEPRESSIVE DISORDER: ICD-10-CM

## 2025-03-25 PROCEDURE — 4010F ACE/ARB THERAPY RXD/TAKEN: CPT | Performed by: PSYCHIATRY & NEUROLOGY

## 2025-03-25 PROCEDURE — 1036F TOBACCO NON-USER: CPT | Performed by: PSYCHIATRY & NEUROLOGY

## 2025-03-25 PROCEDURE — 99214 OFFICE O/P EST MOD 30 MIN: CPT | Performed by: PSYCHIATRY & NEUROLOGY

## 2025-03-25 RX ORDER — PAROXETINE HYDROCHLORIDE HEMIHYDRATE 25 MG/1
50 TABLET, FILM COATED, EXTENDED RELEASE ORAL EVERY MORNING
Qty: 180 TABLET | Refills: 1 | Status: SHIPPED | OUTPATIENT
Start: 2025-03-25 | End: 2025-09-21

## 2025-03-25 RX ORDER — BUPROPION HYDROCHLORIDE 150 MG/1
150 TABLET ORAL DAILY
Qty: 90 TABLET | Refills: 1 | Status: SHIPPED | OUTPATIENT
Start: 2025-03-25 | End: 2025-09-21

## 2025-03-25 ASSESSMENT — PATIENT HEALTH QUESTIONNAIRE - PHQ9
3. TROUBLE FALLING OR STAYING ASLEEP OR SLEEPING TOO MUCH: NEARLY EVERY DAY
2. FEELING DOWN, DEPRESSED OR HOPELESS: NEARLY EVERY DAY
7. TROUBLE CONCENTRATING ON THINGS, SUCH AS READING THE NEWSPAPER OR WATCHING TELEVISION: NEARLY EVERY DAY
SUM OF ALL RESPONSES TO PHQ QUESTIONS 1-9: 23
6. FEELING BAD ABOUT YOURSELF - OR THAT YOU ARE A FAILURE OR HAVE LET YOURSELF OR YOUR FAMILY DOWN: NEARLY EVERY DAY
4. FEELING TIRED OR HAVING LITTLE ENERGY: NEARLY EVERY DAY
1. LITTLE INTEREST OR PLEASURE IN DOING THINGS: NEARLY EVERY DAY
8. MOVING OR SPEAKING SO SLOWLY THAT OTHER PEOPLE COULD HAVE NOTICED. OR THE OPPOSITE, BEING SO FIGETY OR RESTLESS THAT YOU HAVE BEEN MOVING AROUND A LOT MORE THAN USUAL: MORE THAN HALF THE DAYS
10. IF YOU CHECKED OFF ANY PROBLEMS, HOW DIFFICULT HAVE THESE PROBLEMS MADE IT FOR YOU TO DO YOUR WORK, TAKE CARE OF THINGS AT HOME, OR GET ALONG WITH OTHER PEOPLE: VERY DIFFICULT
9. THOUGHTS THAT YOU WOULD BE BETTER OFF DEAD, OR OF HURTING YOURSELF: SEVERAL DAYS
SUM OF ALL RESPONSES TO PHQ9 QUESTIONS 1 AND 2: 6
5. POOR APPETITE OR OVEREATING: MORE THAN HALF THE DAYS

## 2025-03-25 NOTE — PROGRESS NOTES
"Adult Ambulatory Psychiatry Progress Note    Pt is at work  Writer is at [employer]    Virtual or Telephone Consent    An interactive audio and video telecommunication system which permits real time communications between the patient (at the originating site) and provider (at the distant site) was utilized to provide this telehealth service.   Verbal consent was requested and obtained from Serenity Russell on this date, 25 for a telehealth visit.      Assessment/Plan     Impression:  Serenity Russell is a 60 y.o. female domiciled with fijerman and his son, employed who presents for follow up with CC of Anxiety, Depression, PTSD (Post-Traumatic Stress Disorder), and Insomnia.          Plan:   Anxiety/depression/ PTSD - paroxetine CR 50mg daily, wellbutrin XL 150mg daily, increase to rexulti 1mg daily, c/w med ed, psycho ed and supportive psychotherapy, f/u 3 months  Insomnia - pt uses medical marijuana gummies, trazodone 100mg qhs prn, exercise and diet regimen      Subjective   HPI:  Pt arrived on time. Mood \"depressed\". She's used all her sick and vacation days at work already. When she calls out she spends the whole day on the sofa or in bed watching TV. She denies thoughts of taking her life but does have occasional thoughts that it would be easier if she . The rexulti works briefly when it's increased but gradually stops. Discussed increasing dose again to 1mg. Her anxiety is also high. To get groceries she goes to the store when it opens to avoid people. She worries about everything. Discussed IOP. Pt agreed to look into it.          Objective   Mental Status Exam:  General Appearance: Well groomed, appropriate eye contact  Attitude/Behavior: Cooperative  Motor: No psychomotor agitation or retardation, no tremor or other abnormal movements  Speech: Normal rate, volume, prosody  Mood: depressed  Affect: Sad/Tearful  Thought Process: Linear, goal directed  Thought Associations: No loosening of " associations  Thought Content: Normal  Perception: No perceptual abnormalities noted  Insight: Intact  Judgement: Intact      Vitals:  There were no vitals filed for this visit.    Current Medications:  Current Outpatient Medications on File Prior to Visit   Medication Sig Dispense Refill    Allergy Relief, fexofenadine, 180 mg tablet Take 1 tablet (180 mg) by mouth early in the morning..      atorvastatin (Lipitor) 10 mg tablet Take 1 tablet (10 mg) by mouth once daily. 100 tablet 3    benzonatate (Tessalon) 200 mg capsule Take 1 capsule (200 mg) by mouth 3 times a day as needed for cough. Do not crush or chew. 42 capsule 0    cetirizine (ZyrTEC) 10 mg tablet Take 1 tablet (10 mg) by mouth once daily as needed.      dulaglutide (Trulicity) 4.5 mg/0.5 mL pen injector Inject 4.5 mg under the skin 1 (one) time per week. 2 mL 11    empagliflozin (Jardiance) 25 mg Take 1 tablet (25 mg) by mouth once daily. 90 tablet 1    EPINEPHrine 0.3 mg/0.3 mL injection syringe Inject 0.3 mL (0.3 mg) into the muscle 1 time if needed.      estradiol (Estrace) 0.01 % (0.1 mg/gram) vaginal cream Insert 0.5 Applicatorfuls (2 g) into the vagina once daily. Use once nightly for two weeks and then continue twice weekly 42.5 g 12    fluticasone (Flonase) 50 mcg/actuation nasal spray Administer 1 spray into each nostril once daily.      levothyroxine (Synthroid, Levoxyl) 100 mcg tablet TAKE ONE TABLET BY MOUTH IN THE MORNING ON AN EMPTY STOMACH 90 tablet 3    lisinopril 2.5 mg tablet TAKE ONE TABLET BY MOUTH DAILY 90 tablet 3    ondansetron (Zofran) 4 mg tablet TAKE ONE (1) TABLET BY MOUTH EVERY 8 HOURS IF NEEDED FOR NAUSEA OR VOMITING FOR UP TO 7 DAYS 20 tablet 0    Pataday Twice Daily Relief 0.1 % ophthalmic solution INSTILL ONE DROP INTO EACH EYE TWICE DAILY AS NEEDED FOR ITCHY EYES      traZODone (Desyrel) 100 mg tablet TAKE ONE TABLET BY MOUTH ONCE DAILY AT BEDTIME 90 tablet 0    [DISCONTINUED] brexpiprazole (Rexulti) 0.5 mg tablet Take  1 tablet (0.5 mg) by mouth once daily. 30 tablet 1    [DISCONTINUED] brexpiprazole (Rexulti) 0.5 mg tablet Take 1 tablet (0.5 mg) by mouth once daily. 30 tablet 0    [DISCONTINUED] brexpiprazole (Rexulti) 0.5 mg tablet Take 1 tablet (0.5 mg) by mouth once daily. 90 tablet 3    [DISCONTINUED] buPROPion XL (Wellbutrin XL) 150 mg 24 hr tablet Take 1 tablet (150 mg) by mouth once daily. do not crush chew or split 90 tablet 0    [DISCONTINUED] PARoxetine CR (Paxil CR) 25 mg 24 hr tablet Take 2 tablets (50 mg) by mouth once daily in the morning. Do not crush, chew, or split. 180 tablet 0     No current facility-administered medications on file prior to visit.       Lab Review:   Office Visit on 03/13/2025   Component Date Value    POC HEMOGLOBIN A1c 03/13/2025 7.3 (A)     WHITE BLOOD CELL COUNT 03/13/2025 7.4     RED BLOOD CELL COUNT 03/13/2025 5.17 (H)     HEMOGLOBIN 03/13/2025 16.0 (H)     HEMATOCRIT 03/13/2025 46.5 (H)     MCV 03/13/2025 89.9     MCH 03/13/2025 30.9     MCHC 03/13/2025 34.4     RDW 03/13/2025 13.0     PLATELET COUNT 03/13/2025 211     MPV 03/13/2025 12.3     ABSOLUTE NEUTROPHILS 03/13/2025 4,388     ABSOLUTE LYMPHOCYTES 03/13/2025 2,472     ABSOLUTE MONOCYTES 03/13/2025 422     ABSOLUTE EOSINOPHILS 03/13/2025 89     ABSOLUTE BASOPHILS 03/13/2025 30     NEUTROPHILS 03/13/2025 59.3     LYMPHOCYTES 03/13/2025 33.4     MONOCYTES 03/13/2025 5.7     EOSINOPHILS 03/13/2025 1.2     BASOPHILS 03/13/2025 0.4     GLUCOSE 03/13/2025 143 (H)     UREA NITROGEN (BUN) 03/13/2025 24     CREATININE 03/13/2025 0.94     EGFR 03/13/2025 71     SODIUM 03/13/2025 141     POTASSIUM 03/13/2025 3.8     CHLORIDE 03/13/2025 107     CARBON DIOXIDE 03/13/2025 20     ELECTROLYTE BALANCE 03/13/2025 14     CALCIUM 03/13/2025 10.0     PROTEIN, TOTAL 03/13/2025 7.6     ALBUMIN 03/13/2025 4.9     BILIRUBIN, TOTAL 03/13/2025 0.3     ALKALINE PHOSPHATASE 03/13/2025 69     AST 03/13/2025 24     ALT 03/13/2025 37 (H)     TSH W/REFLEX TO FT4  03/13/2025 1.37    Lab on 01/31/2025   Component Date Value    Cancer  01/31/2025 10.0        Orders:  Diagnoses and all orders for this visit:  Moderate episode of recurrent major depressive disorder  -     Follow Up In Psychiatry  -     PARoxetine CR (Paxil CR) 25 mg 24 hr tablet; Take 2 tablets (50 mg) by mouth once daily in the morning. Do not crush, chew, or split.  -     buPROPion XL (Wellbutrin XL) 150 mg 24 hr tablet; Take 1 tablet (150 mg) by mouth once daily. do not crush chew or split  -     brexpiprazole (Rexulti) 1 mg tablet; Take 1 tablet (1 mg) by mouth once daily.  -     Follow Up In Psychiatry; Future  JUDITH (generalized anxiety disorder)  -     PARoxetine CR (Paxil CR) 25 mg 24 hr tablet; Take 2 tablets (50 mg) by mouth once daily in the morning. Do not crush, chew, or split.  PTSD (post-traumatic stress disorder)  -     PARoxetine CR (Paxil CR) 25 mg 24 hr tablet; Take 2 tablets (50 mg) by mouth once daily in the morning. Do not crush, chew, or split.    PHQ9  Over the past 2 weeks, how often have you been bothered by any of the following problems?  Little interest or pleasure in doing things: Nearly every day  Feeling down, depressed, or hopeless: Nearly every day  Trouble falling or staying asleep, or sleeping too much: Nearly every day  Feeling tired or having little energy: Nearly every day  Poor appetite or overeating: More than half the days  Feeling bad about yourself - or that you are a failure or have let yourself or your family down: Nearly every day  Trouble concentrating on things, such as reading the newspaper or watching television: Nearly every day  Moving or speaking so slowly that other people could have noticed? Or the opposite - being so fidgety or restless that you have been moving around a lot more than usual.: More than half the days  Thoughts that you would be better off dead or hurting yourself in some way: Several days  Patient Health Questionnaire-9 Score:  23      Next Appointment:  Follow up in 3 months (on 7/3/2025).

## 2025-04-08 DIAGNOSIS — E05.90 HYPERTHYROIDISM: ICD-10-CM

## 2025-04-08 RX ORDER — LEVOTHYROXINE SODIUM 100 UG/1
TABLET ORAL
Qty: 90 TABLET | Refills: 3 | Status: SHIPPED | OUTPATIENT
Start: 2025-04-08

## 2025-05-05 ENCOUNTER — TELEMEDICINE (OUTPATIENT)
Dept: GYNECOLOGIC ONCOLOGY | Facility: CLINIC | Age: 60
End: 2025-05-05
Payer: COMMERCIAL

## 2025-05-05 DIAGNOSIS — N95.1 VAGINAL DRYNESS, MENOPAUSAL: Primary | ICD-10-CM

## 2025-05-05 PROCEDURE — 99212 OFFICE O/P EST SF 10 MIN: CPT | Performed by: NURSE PRACTITIONER

## 2025-05-05 PROCEDURE — 3051F HG A1C>EQUAL 7.0%<8.0%: CPT | Performed by: NURSE PRACTITIONER

## 2025-05-05 PROCEDURE — 4010F ACE/ARB THERAPY RXD/TAKEN: CPT | Performed by: NURSE PRACTITIONER

## 2025-05-05 NOTE — PROGRESS NOTES
Consent:  Verbal consent was requested and obtained from patient on this date for a telehealth visit.    Patient ID: Serenity Russell is a 60 y.o. female.  Referring Physician: No referring provider defined for this encounter.  Primary Care Provider: Danish Koch DO    Subjective    HPI    HPI: 55 year old G0 here today s/p debulking surgery for grade 1, stage IC endometrioid OVARIAN cancer.     Tumor history:  -Patient reports being diagnosed with left lower extremity DVT on 6/29/17. Patient reports DVT was confirmed with ultrasound. Patient currently being managed by Dr. Giles with daily Lovenox.    - (7/17/17):892.7    -Patient returned for admission 9/19-9/22 with likely lymphocele. Drain placed. No evidence of urinoma.   -CT (10/27/17): done for less output form the drain.  2 of three collections smaller.    -CA-125 (10/26/17): 11.7  -Hypersensitivity reaction with cycle #2.   -11/13/17  - 12.7   -CA-125 (11/13/17):12.7  -12/5/17 - IVC filter removed, right subclavian Mediport placed  -12/6/17 - transfused 2 units PRBCs   -1/4/18  11.3  -1/22/18  - 15  -1/29/18: last cycle (#6) of carbo/taxol  -Patient s/p incision & debridement of abdomen, mons pubis and right vulva on 2/10/18 for management of necrotizing fasciitis. Patient shared she is slowly regaining her strength. Patient reports VAC was applied on 3/23/18 with home care nurse recently  sharing that the wound has shown dramatic wound healing with VAC.  -Genetic testing negative.      Interval History: Serenity is a 58 year female with history of grade 1, stage IC endometrioid ovarian cancer. Patient with remote history of hysterectomy in 2011 for dysfunctional bleeding. She underwent exploratory laparotomy, bilateral salpingo oophorectomy, enterolysis, lysis of adhesions, bilateral pelvic lymph node dissection, omentectomy, peritoneal biopsies, omental biopsy, cystoscopy on 8/22/17. She completed 6 cycles of carbo/taxol. Treatment  complicated by necrotizing fasciitis, s/p debridement of abdomen, vulva, and mons pubis on 2/10/18. CA-125 was normal at 10 in 2025. Patient underwent ex lap, MARIO, retrorectus incisional hernia repair with mesh on  with Dr. Garcia. Underwent endoscopy and HIDA scan recently, which were normal. No energy changes. No VB or discharge. She denies fevers, chills, chest pain, shortness of breath, nausea, vomiting. Reports improvement in dryness and dyspareunia since starting vaginal estrogen cream.      PMH: morbid obesity, depression, DVT, ovarian cancer, necrotizing fascitis, DMII, hypothyroidism.      Past Surgical History: left ankle fracture with internal fixation (), pins removed (); hysterectomy ( for dysfunctional bleeding), Exploratory laparotomy, bilateral salpingo oophorectomy, enterolysis, lysis of adhesions, bilateral pelvic lymph  node dissection, omentectomy, peritoneal biopsies, omental biopsy, cystoscopy on 17. 2/10/18 Debridement of her abdomen, vulva, and mons.          Family History: mother - AML (85), breast (84); father - bladder (57), maternal grandmother with breast cancer and  at a young age.      Social History: Patient reports being a former cigarette smoker. Patient describes occasional alcohol use, denies recreational drug use.       OB/GYN History: Patient is G0. Patient is unaware of when entered menopause due to hysterectomy, HRT since . Patient denies history of abnormal pap smears.      Screening:    Mammogram: 2018: neg     Colonoscopy: 2000 - polyps per patient      Refer: Dmitri Sahni MD  PCP: Danish Koch MD    Objective    BSA: There is no height or weight on file to calculate BSA.  There were no vitals taken for this visit.     Physical Exam    Performance Status:  Asymptomatic    Assessment/Plan      Serenity is a 60 y.o. female with history of grade 1, stage IC endometrioid ovarian cancer. Patient with remote history of hysterectomy in   for dysfunctional bleeding.  She underwent exploratory laparotomy, bilateral salpingo oophorectomy, enterolysis, lysis of adhesions, bilateral pelvic lymph node dissection, omentectomy, peritoneal biopsies, omental biopsy, cystoscopy on 8/22/17. She completed 6 cycles of carbo/taxol.  Treatment complicated by necrotizing fasciitis, s/p debridement of abdomen, vulva, and mons pubis on 2/10/18. CA-125 will be drawn tomorrow.     Plan:    Improvement in vaginal dryness and dyspareunia with vaginal estrogen, plan to continue     No longer taking gabapentin for neuropathy     Follow up in February or sooner if needed for cancer surveillance visit     Danielle Wang, APRN-CNP    I performed this visit using real-time telehealth tools, including an audio/video connection between the patient and myself. I spent 5 minutes virtually with this patient and/or family. More than 50% of the time was spent in counseling and/or coordination of care.

## 2025-05-12 ENCOUNTER — APPOINTMENT (OUTPATIENT)
Dept: GYNECOLOGIC ONCOLOGY | Facility: CLINIC | Age: 60
End: 2025-05-12
Payer: COMMERCIAL

## 2025-05-13 ENCOUNTER — OFFICE VISIT (OUTPATIENT)
Dept: PRIMARY CARE | Facility: CLINIC | Age: 60
End: 2025-05-13
Payer: COMMERCIAL

## 2025-05-13 VITALS
SYSTOLIC BLOOD PRESSURE: 116 MMHG | TEMPERATURE: 98.5 F | WEIGHT: 190 LBS | BODY MASS INDEX: 33.66 KG/M2 | HEIGHT: 63 IN | DIASTOLIC BLOOD PRESSURE: 70 MMHG | OXYGEN SATURATION: 99 % | RESPIRATION RATE: 20 BRPM | HEART RATE: 78 BPM

## 2025-05-13 DIAGNOSIS — J01.00 ACUTE NON-RECURRENT MAXILLARY SINUSITIS: Primary | ICD-10-CM

## 2025-05-13 PROCEDURE — 3078F DIAST BP <80 MM HG: CPT | Performed by: FAMILY MEDICINE

## 2025-05-13 PROCEDURE — 1036F TOBACCO NON-USER: CPT | Performed by: FAMILY MEDICINE

## 2025-05-13 PROCEDURE — 3074F SYST BP LT 130 MM HG: CPT | Performed by: FAMILY MEDICINE

## 2025-05-13 PROCEDURE — 99213 OFFICE O/P EST LOW 20 MIN: CPT | Performed by: FAMILY MEDICINE

## 2025-05-13 PROCEDURE — 3008F BODY MASS INDEX DOCD: CPT | Performed by: FAMILY MEDICINE

## 2025-05-13 PROCEDURE — 4010F ACE/ARB THERAPY RXD/TAKEN: CPT | Performed by: FAMILY MEDICINE

## 2025-05-13 PROCEDURE — 3051F HG A1C>EQUAL 7.0%<8.0%: CPT | Performed by: FAMILY MEDICINE

## 2025-05-13 RX ORDER — AMOXICILLIN 500 MG/1
1000 CAPSULE ORAL 2 TIMES DAILY
Qty: 40 CAPSULE | Refills: 0 | Status: SHIPPED | OUTPATIENT
Start: 2025-05-13 | End: 2025-05-23

## 2025-05-13 ASSESSMENT — PAIN SCALES - GENERAL: PAINLEVEL_OUTOF10: 0-NO PAIN

## 2025-05-13 ASSESSMENT — ENCOUNTER SYMPTOMS
OCCASIONAL FEELINGS OF UNSTEADINESS: 0
LOSS OF SENSATION IN FEET: 0
DEPRESSION: 0

## 2025-05-13 NOTE — PROGRESS NOTES
"Subjective   Patient ID: Serenity Russell is a 60 y.o. female who presents for Fatigue (PATIENT STATES FOR OVER  A WEEK SHE'S FEELING VERY TIRED AND RUN DOWN AND SLEEPS ALOT).    HPI for 4-5 days has been sick and feels tired/ST/sinus pressure/right ear discomfort.  No fever.     Review of Systemssee HPI    Objective   /70 (BP Location: Right arm, Patient Position: Sitting, BP Cuff Size: Adult)   Pulse 78   Temp 36.9 °C (98.5 °F) (Skin)   Resp 20   Ht 1.6 m (5' 3\")   Wt 86.2 kg (190 lb)   SpO2 99%   BMI 33.66 kg/m²     Physical Exam  Constitutional:       Appearance: Normal appearance.   HENT:      Right Ear: Tympanic membrane normal. There is no impacted cerumen.      Left Ear: Tympanic membrane normal. There is no impacted cerumen.      Nose: Congestion present.   Eyes:      General:         Right eye: No discharge.         Left eye: No discharge.      Extraocular Movements: Extraocular movements intact.      Pupils: Pupils are equal, round, and reactive to light.   Cardiovascular:      Rate and Rhythm: Normal rate and regular rhythm.   Pulmonary:      Breath sounds: Normal breath sounds.   Neurological:      Mental Status: She is alert.         Assessment/Plan   Problem List Items Addressed This Visit    None  Visit Diagnoses         Codes      Acute non-recurrent maxillary sinusitis    -  Primary J01.00    Relevant Medications    amoxicillin (Amoxil) 500 mg capsule        Increase fluids and follow up 1 week if not resolving.        "

## 2025-05-13 NOTE — LETTER
May 13, 2025     Patient: Serenity Russell   YOB: 1965   Date of Visit: 5/13/2025       To Whom It May Concern:    Serenity Russell was seen in my clinic on 5/13/2025 at 10:15 am. Please excuse Serenity for her absence from work on this day to make the appointment.    If you have any questions or concerns, please don't hesitate to call.         Sincerely,         Danish Koch, DO        CC: No Recipients

## 2025-05-18 ENCOUNTER — PATIENT MESSAGE (OUTPATIENT)
Dept: BEHAVIORAL HEALTH | Facility: CLINIC | Age: 60
End: 2025-05-18
Payer: COMMERCIAL

## 2025-05-18 DIAGNOSIS — F33.1 MODERATE EPISODE OF RECURRENT MAJOR DEPRESSIVE DISORDER: ICD-10-CM

## 2025-05-20 DIAGNOSIS — G47.00 INSOMNIA, UNSPECIFIED TYPE: ICD-10-CM

## 2025-05-20 RX ORDER — TRAZODONE HYDROCHLORIDE 100 MG/1
100 TABLET ORAL NIGHTLY
Qty: 90 TABLET | Refills: 0 | Status: SHIPPED | OUTPATIENT
Start: 2025-05-20

## 2025-06-02 ENCOUNTER — HOSPITAL ENCOUNTER (OUTPATIENT)
Dept: RADIOLOGY | Facility: HOSPITAL | Age: 60
Discharge: HOME | End: 2025-06-02
Payer: COMMERCIAL

## 2025-06-02 DIAGNOSIS — Z00.00 ROUTINE MEDICAL EXAM: ICD-10-CM

## 2025-06-02 PROCEDURE — 75571 CT HRT W/O DYE W/CA TEST: CPT

## 2025-06-24 ENCOUNTER — HOSPITAL ENCOUNTER (OUTPATIENT)
Dept: RADIOLOGY | Facility: CLINIC | Age: 60
Discharge: HOME | End: 2025-06-24
Payer: COMMERCIAL

## 2025-06-24 ENCOUNTER — APPOINTMENT (OUTPATIENT)
Dept: PRIMARY CARE | Facility: CLINIC | Age: 60
End: 2025-06-24
Payer: COMMERCIAL

## 2025-06-24 VITALS
BODY MASS INDEX: 36.32 KG/M2 | OXYGEN SATURATION: 99 % | HEART RATE: 76 BPM | SYSTOLIC BLOOD PRESSURE: 118 MMHG | WEIGHT: 205 LBS | TEMPERATURE: 98.6 F | HEIGHT: 63 IN | DIASTOLIC BLOOD PRESSURE: 78 MMHG | RESPIRATION RATE: 20 BRPM

## 2025-06-24 DIAGNOSIS — G89.29 CHRONIC NECK PAIN: ICD-10-CM

## 2025-06-24 DIAGNOSIS — K44.9 HIATAL HERNIA: ICD-10-CM

## 2025-06-24 DIAGNOSIS — M54.2 CHRONIC NECK PAIN: ICD-10-CM

## 2025-06-24 DIAGNOSIS — M54.50 CHRONIC BILATERAL LOW BACK PAIN WITHOUT SCIATICA: ICD-10-CM

## 2025-06-24 DIAGNOSIS — E11.9 TYPE 2 DIABETES MELLITUS WITHOUT COMPLICATION, WITHOUT LONG-TERM CURRENT USE OF INSULIN: Primary | ICD-10-CM

## 2025-06-24 DIAGNOSIS — G89.29 CHRONIC BILATERAL LOW BACK PAIN WITHOUT SCIATICA: ICD-10-CM

## 2025-06-24 LAB — POC HEMOGLOBIN A1C: 7.7 % (ref 4.2–6.5)

## 2025-06-24 PROCEDURE — 3051F HG A1C>EQUAL 7.0%<8.0%: CPT | Performed by: FAMILY MEDICINE

## 2025-06-24 PROCEDURE — 72110 X-RAY EXAM L-2 SPINE 4/>VWS: CPT

## 2025-06-24 PROCEDURE — 72050 X-RAY EXAM NECK SPINE 4/5VWS: CPT

## 2025-06-24 PROCEDURE — 4010F ACE/ARB THERAPY RXD/TAKEN: CPT | Performed by: FAMILY MEDICINE

## 2025-06-24 PROCEDURE — 72110 X-RAY EXAM L-2 SPINE 4/>VWS: CPT | Performed by: RADIOLOGY

## 2025-06-24 PROCEDURE — 72050 X-RAY EXAM NECK SPINE 4/5VWS: CPT | Performed by: RADIOLOGY

## 2025-06-24 PROCEDURE — 83036 HEMOGLOBIN GLYCOSYLATED A1C: CPT | Performed by: FAMILY MEDICINE

## 2025-06-24 PROCEDURE — 3078F DIAST BP <80 MM HG: CPT | Performed by: FAMILY MEDICINE

## 2025-06-24 PROCEDURE — 3008F BODY MASS INDEX DOCD: CPT | Performed by: FAMILY MEDICINE

## 2025-06-24 PROCEDURE — 99214 OFFICE O/P EST MOD 30 MIN: CPT | Performed by: FAMILY MEDICINE

## 2025-06-24 PROCEDURE — 1036F TOBACCO NON-USER: CPT | Performed by: FAMILY MEDICINE

## 2025-06-24 PROCEDURE — 3074F SYST BP LT 130 MM HG: CPT | Performed by: FAMILY MEDICINE

## 2025-06-24 ASSESSMENT — ENCOUNTER SYMPTOMS
MUSCULOSKELETAL NEGATIVE: 1
RESPIRATORY NEGATIVE: 1
NEUROLOGICAL NEGATIVE: 1
DEPRESSION: 0
GASTROINTESTINAL NEGATIVE: 1
CONSTITUTIONAL NEGATIVE: 1
OCCASIONAL FEELINGS OF UNSTEADINESS: 0
CARDIOVASCULAR NEGATIVE: 1
LOSS OF SENSATION IN FEET: 0

## 2025-06-24 ASSESSMENT — PAIN SCALES - GENERAL: PAINLEVEL_OUTOF10: 0-NO PAIN

## 2025-06-24 NOTE — PROGRESS NOTES
"Subjective   Patient ID: Serenity Russell is a 60 y.o. female who presents for Diabetes (HERE FOR DIABETES CHECK).    Diabetes     A1c today is 7.7 up from 7.3  He apatite has increased again.  She walks occasional and is in her pool more now this summer.    Also some chronic back pain.      Review of Systems   Constitutional: Negative.    HENT: Negative.     Respiratory: Negative.     Cardiovascular: Negative.    Gastrointestinal: Negative.    Genitourinary: Negative.    Musculoskeletal: Negative.    Neurological: Negative.        Objective   /78 (BP Location: Right arm, Patient Position: Sitting, BP Cuff Size: Adult)   Pulse 76   Temp 37 °C (98.6 °F) (Skin)   Resp 20   Ht 1.6 m (5' 3\")   Wt 93 kg (205 lb)   SpO2 99%   BMI 36.31 kg/m²     Physical Exam  Constitutional:       General: She is not in acute distress.     Appearance: Normal appearance.   Cardiovascular:      Rate and Rhythm: Normal rate and regular rhythm.      Heart sounds: No murmur heard.  Pulmonary:      Breath sounds: Normal breath sounds. No wheezing.   Neurological:      Mental Status: She is alert.         Assessment/Plan   Problem List Items Addressed This Visit           ICD-10-CM    Type 2 diabetes mellitus - Primary E11.9    Relevant Orders    POCT glycosylated hemoglobin (Hb A1C) manually resulted (Completed)     Other Visit Diagnoses         Codes      Chronic neck pain     M54.2, G89.29    Relevant Orders    XR cervical spine 2-3 views      Chronic bilateral low back pain without sciatica     M54.50, G89.29    Relevant Orders    XR lumbar spine 2-3 views      Hiatal hernia     K44.9             Will change to Trulicity to Mounjaro 7.5 mg and recheck 3 months.  Work on good diet and exercise.    Offered PT for back pain and she will consider and let us know when ready.   Only occasional gerd symptoms.     "

## 2025-07-03 ENCOUNTER — PATIENT MESSAGE (OUTPATIENT)
Dept: PRIMARY CARE | Facility: CLINIC | Age: 60
End: 2025-07-03

## 2025-07-03 ENCOUNTER — TELEPHONE (OUTPATIENT)
Dept: BEHAVIORAL HEALTH | Facility: CLINIC | Age: 60
End: 2025-07-03

## 2025-07-03 ENCOUNTER — APPOINTMENT (OUTPATIENT)
Dept: BEHAVIORAL HEALTH | Facility: CLINIC | Age: 60
End: 2025-07-03
Payer: COMMERCIAL

## 2025-07-03 DIAGNOSIS — E11.9 TYPE 2 DIABETES MELLITUS WITHOUT COMPLICATION, WITHOUT LONG-TERM CURRENT USE OF INSULIN: Primary | ICD-10-CM

## 2025-07-03 DIAGNOSIS — F43.10 PTSD (POST-TRAUMATIC STRESS DISORDER): ICD-10-CM

## 2025-07-03 DIAGNOSIS — F41.1 GAD (GENERALIZED ANXIETY DISORDER): ICD-10-CM

## 2025-07-03 DIAGNOSIS — G47.00 INSOMNIA, UNSPECIFIED TYPE: ICD-10-CM

## 2025-07-03 DIAGNOSIS — F33.1 MODERATE EPISODE OF RECURRENT MAJOR DEPRESSIVE DISORDER: ICD-10-CM

## 2025-07-03 PROCEDURE — 99214 OFFICE O/P EST MOD 30 MIN: CPT | Performed by: PSYCHIATRY & NEUROLOGY

## 2025-07-03 PROCEDURE — 4010F ACE/ARB THERAPY RXD/TAKEN: CPT | Performed by: PSYCHIATRY & NEUROLOGY

## 2025-07-03 PROCEDURE — 1036F TOBACCO NON-USER: CPT | Performed by: PSYCHIATRY & NEUROLOGY

## 2025-07-03 PROCEDURE — 3051F HG A1C>EQUAL 7.0%<8.0%: CPT | Performed by: PSYCHIATRY & NEUROLOGY

## 2025-07-03 RX ORDER — BUPROPION HYDROCHLORIDE 150 MG/1
150 TABLET ORAL DAILY
Qty: 90 TABLET | Refills: 1 | Status: SHIPPED | OUTPATIENT
Start: 2025-07-03 | End: 2025-12-30

## 2025-07-03 RX ORDER — TIRZEPATIDE 7.5 MG/.5ML
7.5 INJECTION, SOLUTION SUBCUTANEOUS
Qty: 2 ML | Refills: 2 | Status: SHIPPED | OUTPATIENT
Start: 2025-07-03

## 2025-07-03 RX ORDER — PAROXETINE HYDROCHLORIDE HEMIHYDRATE 25 MG/1
50 TABLET, FILM COATED, EXTENDED RELEASE ORAL EVERY MORNING
Qty: 180 TABLET | Refills: 1 | Status: SHIPPED | OUTPATIENT
Start: 2025-07-03 | End: 2025-12-30

## 2025-07-03 RX ORDER — TRAZODONE HYDROCHLORIDE 100 MG/1
100 TABLET ORAL NIGHTLY
Qty: 90 TABLET | Refills: 1 | Status: SHIPPED | OUTPATIENT
Start: 2025-07-03 | End: 2025-12-30

## 2025-07-03 ASSESSMENT — PATIENT HEALTH QUESTIONNAIRE - PHQ9
SUM OF ALL RESPONSES TO PHQ9 QUESTIONS 1 AND 2: 2
1. LITTLE INTEREST OR PLEASURE IN DOING THINGS: SEVERAL DAYS
2. FEELING DOWN, DEPRESSED OR HOPELESS: SEVERAL DAYS

## 2025-07-03 NOTE — PROGRESS NOTES
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   Verbal consent was requested and obtained from Serenity Russell on this date, 07/03/25 for a telehealth visit.      Assessment/Plan     Impression:  Serenity Russell is a 60 y.o. female domiciled with fijerman and his son, employed who presents for follow up with CC of Depression, Anxiety, and PTSD (Post-Traumatic Stress Disorder).          Plan:   Anxiety/depression/ PTSD - paroxetine CR 50mg daily, wellbutrin XL 150mg daily, increase to rexulti 1mg daily, c/w med ed, psycho ed and supportive psychotherapy, f/u 3 months  Insomnia - pt uses medical marijuana gummies, trazodone 100mg qhs prn, exercise and diet regimen      Subjective   HPI:  Pt arrived on time. Mood \"pretty good\". She was able to increase the rexulti. She feels it's been helpful. Her frustration tolerance is a little better. She's spending few days in bed. Denies further passive SI. Anxiety is \"pretty good\". Anxiety still occurs but it's manageable now. Sleep is \"pretty good\". Appetite is good but not higher. She feels satisfied with how the medicines are helping.           Objective   Mental Status Exam:  General Appearance: Well groomed, appropriate eye contact  Attitude/Behavior: Cooperative  Motor: No psychomotor agitation or retardation, no tremor or other abnormal movements  Speech: Normal rate, volume, prosody  Mood: \"pretty good\"  Affect: Euthymic, full-range  Thought Process: Linear, goal directed  Thought Associations: No loosening of associations  Thought Content: Normal  Perception: No perceptual abnormalities noted  Insight: Intact  Judgement: Intact      Vitals:  There were no vitals " filed for this visit.    Current Medications:  Current Outpatient Medications on File Prior to Visit   Medication Sig Dispense Refill    Allergy Relief, fexofenadine, 180 mg tablet Take 1 tablet (180 mg) by mouth early in the morning..      atorvastatin (Lipitor) 10 mg tablet Take 1 tablet (10 mg) by mouth once daily. 100 tablet 3    cetirizine (ZyrTEC) 10 mg tablet Take 1 tablet (10 mg) by mouth once daily as needed.      empagliflozin (Jardiance) 25 mg Take 1 tablet (25 mg) by mouth once daily. 90 tablet 1    EPINEPHrine 0.3 mg/0.3 mL injection syringe Inject 0.3 mL (0.3 mg) into the muscle 1 time if needed.      estradiol (Estrace) 0.01 % (0.1 mg/gram) vaginal cream Insert 0.5 Applicatorfuls (2 g) into the vagina once daily. Use once nightly for two weeks and then continue twice weekly 42.5 g 12    fluticasone (Flonase) 50 mcg/actuation nasal spray Administer 1 spray into each nostril once daily.      levothyroxine (Synthroid, Levoxyl) 100 mcg tablet TAKE ONE TABLET BY MOUTH DAILY IN THE MORNING ON AN EMPTY STOMACH 90 tablet 3    lisinopril 2.5 mg tablet TAKE ONE TABLET BY MOUTH DAILY 90 tablet 3    ondansetron (Zofran) 4 mg tablet TAKE ONE (1) TABLET BY MOUTH EVERY 8 HOURS IF NEEDED FOR NAUSEA OR VOMITING FOR UP TO 7 DAYS 20 tablet 0    Pataday Twice Daily Relief 0.1 % ophthalmic solution INSTILL ONE DROP INTO EACH EYE TWICE DAILY AS NEEDED FOR ITCHY EYES      [DISCONTINUED] brexpiprazole (Rexulti) 1 mg tablet Take 1 tablet (1 mg) by mouth once daily. 90 tablet 0    [DISCONTINUED] buPROPion XL (Wellbutrin XL) 150 mg 24 hr tablet Take 1 tablet (150 mg) by mouth once daily. do not crush chew or split 90 tablet 1    [DISCONTINUED] dulaglutide (Trulicity) 4.5 mg/0.5 mL pen injector Inject 4.5 mg under the skin 1 (one) time per week. 2 mL 11    [DISCONTINUED] PARoxetine CR (Paxil CR) 25 mg 24 hr tablet Take 2 tablets (50 mg) by mouth once daily in the morning. Do not crush, chew, or split. 180 tablet 1     [DISCONTINUED] traZODone (Desyrel) 100 mg tablet TAKE 1 TABLET BY MOUTH ONCE DAILY AT BEDTIME 90 tablet 0     No current facility-administered medications on file prior to visit.       Lab Review:   Office Visit on 06/24/2025   Component Date Value    POC HEMOGLOBIN A1c 06/24/2025 7.7 (A)        Orders:  Diagnoses and all orders for this visit:  Moderate episode of recurrent major depressive disorder  -     Follow Up In Psychiatry  -     PARoxetine CR (Paxil CR) 25 mg 24 hr tablet; Take 2 tablets (50 mg) by mouth once daily in the morning. Do not crush, chew, or split.  -     buPROPion XL (Wellbutrin XL) 150 mg 24 hr tablet; Take 1 tablet (150 mg) by mouth once daily. do not crush chew or split  -     brexpiprazole (Rexulti) 1 mg tablet; Take 1 tablet (1 mg) by mouth once daily.  -     Follow Up In Psychiatry; Future  Insomnia, unspecified type  -     traZODone (Desyrel) 100 mg tablet; Take 1 tablet (100 mg) by mouth once daily at bedtime.  JUDITH (generalized anxiety disorder)  -     PARoxetine CR (Paxil CR) 25 mg 24 hr tablet; Take 2 tablets (50 mg) by mouth once daily in the morning. Do not crush, chew, or split.  PTSD (post-traumatic stress disorder)  -     PARoxetine CR (Paxil CR) 25 mg 24 hr tablet; Take 2 tablets (50 mg) by mouth once daily in the morning. Do not crush, chew, or split.    PHQ9  Over the past 2 weeks, how often have you been bothered by any of the following problems?  Little interest or pleasure in doing things: Several days  Feeling down, depressed, or hopeless: Several days      Next Appointment:  Follow up in 15 weeks (on 10/16/2025).

## 2025-07-07 ENCOUNTER — PHARMACY VISIT (OUTPATIENT)
Dept: PHARMACY | Facility: CLINIC | Age: 60
End: 2025-07-07
Payer: COMMERCIAL

## 2025-07-07 PROCEDURE — RXMED WILLOW AMBULATORY MEDICATION CHARGE

## 2025-08-04 ENCOUNTER — PHARMACY VISIT (OUTPATIENT)
Dept: PHARMACY | Facility: CLINIC | Age: 60
End: 2025-08-04
Payer: COMMERCIAL

## 2025-08-04 PROCEDURE — RXMED WILLOW AMBULATORY MEDICATION CHARGE

## 2025-08-11 ENCOUNTER — TELEPHONE (OUTPATIENT)
Dept: BEHAVIORAL HEALTH | Facility: CLINIC | Age: 60
End: 2025-08-11
Payer: COMMERCIAL

## 2025-08-11 DIAGNOSIS — F33.1 MODERATE EPISODE OF RECURRENT MAJOR DEPRESSIVE DISORDER: ICD-10-CM

## 2025-08-28 PROCEDURE — RXMED WILLOW AMBULATORY MEDICATION CHARGE

## 2025-08-29 ENCOUNTER — PHARMACY VISIT (OUTPATIENT)
Dept: PHARMACY | Facility: CLINIC | Age: 60
End: 2025-08-29
Payer: COMMERCIAL

## 2025-10-16 ENCOUNTER — APPOINTMENT (OUTPATIENT)
Dept: BEHAVIORAL HEALTH | Facility: CLINIC | Age: 60
End: 2025-10-16
Payer: COMMERCIAL

## 2025-11-24 ENCOUNTER — APPOINTMENT (OUTPATIENT)
Dept: OPHTHALMOLOGY | Facility: CLINIC | Age: 60
End: 2025-11-24
Payer: COMMERCIAL